# Patient Record
Sex: FEMALE | Race: WHITE | Employment: UNEMPLOYED | RURAL
[De-identification: names, ages, dates, MRNs, and addresses within clinical notes are randomized per-mention and may not be internally consistent; named-entity substitution may affect disease eponyms.]

---

## 2017-01-11 ENCOUNTER — OFFICE VISIT (OUTPATIENT)
Dept: FAMILY MEDICINE CLINIC | Age: 62
End: 2017-01-11

## 2017-01-11 VITALS
HEIGHT: 62 IN | SYSTOLIC BLOOD PRESSURE: 123 MMHG | BODY MASS INDEX: 25.58 KG/M2 | DIASTOLIC BLOOD PRESSURE: 70 MMHG | TEMPERATURE: 97.2 F | WEIGHT: 139 LBS | RESPIRATION RATE: 18 BRPM | OXYGEN SATURATION: 97 % | HEART RATE: 98 BPM

## 2017-01-11 DIAGNOSIS — H69.92 EUSTACHIAN TUBE DISORDER, LEFT: ICD-10-CM

## 2017-01-11 DIAGNOSIS — J01.00 ACUTE NON-RECURRENT MAXILLARY SINUSITIS: Primary | ICD-10-CM

## 2017-01-11 DIAGNOSIS — J20.9 COPD (CHRONIC OBSTRUCTIVE PULMONARY DISEASE) WITH ACUTE BRONCHITIS (HCC): ICD-10-CM

## 2017-01-11 DIAGNOSIS — Z72.0 TOBACCO ABUSE: ICD-10-CM

## 2017-01-11 DIAGNOSIS — J44.0 COPD (CHRONIC OBSTRUCTIVE PULMONARY DISEASE) WITH ACUTE BRONCHITIS (HCC): ICD-10-CM

## 2017-01-11 RX ORDER — AMOXICILLIN AND CLAVULANATE POTASSIUM 500; 125 MG/1; MG/1
1 TABLET, FILM COATED ORAL 2 TIMES DAILY
Qty: 20 TAB | Refills: 0 | Status: SHIPPED | OUTPATIENT
Start: 2017-01-11 | End: 2017-01-21

## 2017-01-11 RX ORDER — FLUTICASONE PROPIONATE 50 MCG
SPRAY, SUSPENSION (ML) NASAL
Qty: 1 BOTTLE | Refills: 0 | Status: SHIPPED | OUTPATIENT
Start: 2017-01-11 | End: 2017-10-06

## 2017-01-11 NOTE — PROGRESS NOTES
Titus Castelan is a 64 y.o. female who presents to the office today with the following:  Chief Complaint   Patient presents with    Sinus Infection       No Known Allergies    Current Outpatient Prescriptions   Medication Sig    amoxicillin-clavulanate (AUGMENTIN) 500-125 mg per tablet Take 1 Tab by mouth two (2) times a day for 10 days.  fluticasone (FLONASE) 50 mcg/actuation nasal spray 2 puffs each nostril daily    albuterol (PROAIR HFA) 90 mcg/actuation inhaler USE 2 INHALATIONS ORALLY   EVERY 6 HOURS AS NEEDED FORWHEEZING    salmon oil-omega-3 fatty acids 1,000-210 mg cap Take  by mouth daily.  melatonin (MELATONIN) 5 mg cap capsule Take 5 mg by mouth two (2) times a day. At night    citalopram (CELEXA) 10 mg tablet Take 1 Tab by mouth daily.  traZODone (DESYREL) 50 mg tablet Take 1 Tab by mouth nightly.  Cholecalciferol, Vitamin D3, (VITAMIN D3) 1,000 unit cap Take 1 Each by mouth daily.  letrozole (FEMARA) 2.5 mg tablet Take 2.5 mg by mouth daily.  acetaminophen (TYLENOL) 500 mg tablet Take 500 mg by mouth every six (6) hours as needed for Pain.  vitamin e 600 unit capsule Take  by mouth daily. No current facility-administered medications for this visit.         Past Medical History   Diagnosis Date    Arthritis      HANDS    Cancer ([facility] [address] 6/2014     BREAST    Chronic obstructive pulmonary disease (HCC)     Compression fracture      T7, mild on CXR    COPD (chronic obstructive pulmonary disease) (HCC)      mild on CXR    Depression     Diverticulosis     ETOH abuse     Fracture 6/6/14     LEFT 4TH TOE    Hyperlipidemia     Hypertension     Psychiatric disorder      ANXIETY, DEPRESSION       Past Surgical History   Procedure Laterality Date    Hx tubal ligation  1980    Hx gyn  1983 (AFTER HAVING LOST A CHILD)     REVERSAL TUBAL LIGATION    Hx colonoscopy  2010     polyps, q4y    Hx colonoscopy  2015     polyps x4, 3 adenomatous, 1 hyperplastic    Hx breast lumpectomy Left 6/17/14       History   Smoking Status    Current Every Day Smoker    Packs/day: 1.00    Years: 45.00   Smokeless Tobacco    Never Used     Comment: 6/13/14 IS CUTTING BACK, SHE STATES; GAVE HER \"STOP SMOKING\" PACKET       Family History   Problem Relation Age of Onset    Heart Disease Father     Cancer Sister      lung    Anesth Problems Neg Hx          History of Present Illness:  PHQ 2 / 9, over the last two weeks 6/16/2015   Little interest or pleasure in doing things Not at all   Feeling down, depressed or hopeless Not at all   Total Score PHQ 2 0     Patient of Dr. Daysi Gamino here for evaluation sinus complaints    Patient states over the last 2 weeks she has had URI complaints with nasal congestion, left-sided sinus pain and pressure. No significant drainage from the left nostril but some clear drainage from the right. During this time her left ear feels plugged and she has increased ringing in the left ear. No dizziness. She does have a history of COPD. She does continue to smoke. She has had some intermittent coughing during this time. She denies any significant chest congestion. No wheezes no shortness of breath. She has albuterol but has not felt the need to use this    She is planning on quitting smoking soon    She has declined flu shots and pneumonia shots          Review of Systems:          Physical Exam:  Visit Vitals    /70    Pulse 98    Temp 97.2 °F (36.2 °C) (Oral)    Resp 18    Ht 5' 2\" (1.575 m)    Wt 139 lb (63 kg)    SpO2 97%    BMI 25.42 kg/m2     Vitals:    01/11/17 1302   BP: 123/70   Pulse: 98   Resp: 18   Temp: 97.2 °F (36.2 °C)   TempSrc: Oral   SpO2: 97%   Weight: 139 lb (63 kg)   Height: 5' 2\" (1.575 m)    Patient no acute distress vitals as above  Head was normocephalic  External ears were normal.  Ear canals normal.  TMs were clear on the left. Some serous fluid on the right  Nose external ears normal.  No lesions.   Plus   congestion with very boggy turbinates. Left nares obstructed by the turbinates  Sinuses did have some tenderness of the left maxillary sinus  OP Mucosa normal.  Pharynx normal.  No erythema or exudate. Structures midline  Neck no nodes no masses  Chest had coarse expiratory breath sounds no wheezes rhonchi or rales. Good air exchange  Cor regular rate and rhythm no murmurs      Assessment/Plan:  1. Acute non-recurrent maxillary sinusitis  Patient with URI/sinusitis nasal congestion and eustachian tube dysfunction. Will treat her with Augmentin and Flonase. I did review eustachian tube exercises. She will follow-up if symptoms persist    2. Eustachian tube disorder, left      3. Tobacco abuse  Patient advised to stop smoking    4. COPD (chronic obstructive pulmonary disease) with acute bronchitis ([address]  May use albuterol as needed    Patient will otherwise keep her previously planned follow-up with Dr. Roberta Craig for routine medical care    Orders Placed This Encounter    amoxicillin-clavulanate (AUGMENTIN) 500-125 mg per tablet    fluticasone (FLONASE) 50 mcg/actuation nasal spray   . Cipriano Brown Patient Instructions   Home, rest, lots of fluids, vaporizer if needed. augmentin x 10 days  flonase daily  eustacian tube exercises  Over the counter cold medications if needed. Follow up if worse or not better next 3-5 days. Stop smoking  Keep planned follow up              Continue current therapy plan except for indicated above. Verbal and written instructions (see AVS) provided.  Patient expresses understanding of diagnosis and treatment plan. Follow-up Disposition:  Return if symptoms worsen or fail to improve. Qian Gonzalez.  Rebeca Randle MD

## 2017-01-11 NOTE — PATIENT INSTRUCTIONS
Home, rest, lots of fluids, vaporizer if needed. augmentin x 10 days  flonase daily  eustacian tube exercises  Over the counter cold medications if needed. Follow up if worse or not better next 3-5 days.     Stop smoking  Keep planned follow up

## 2017-02-15 ENCOUNTER — TELEPHONE (OUTPATIENT)
Dept: FAMILY MEDICINE CLINIC | Age: 62
End: 2017-02-15

## 2017-02-15 DIAGNOSIS — R92.8 ABNORMAL MAMMOGRAM: Primary | ICD-10-CM

## 2017-02-15 NOTE — TELEPHONE ENCOUNTER
Pt has a 6 mo f/u mammogram scheduled at [facility] on 2/20/2017 in the am. She needs an order sent over. She had an abnormal mammo 6 months ago.

## 2017-02-21 ENCOUNTER — TELEPHONE (OUTPATIENT)
Dept: FAMILY MEDICINE CLINIC | Age: 62
End: 2017-02-21

## 2017-02-24 ENCOUNTER — TELEPHONE (OUTPATIENT)
Dept: FAMILY MEDICINE CLINIC | Age: 62
End: 2017-02-24

## 2017-02-27 DIAGNOSIS — R92.8 ABNORMAL MAMMOGRAM: ICD-10-CM

## 2017-04-17 DIAGNOSIS — G47.00 INSOMNIA, UNSPECIFIED TYPE: ICD-10-CM

## 2017-04-17 RX ORDER — TRAZODONE HYDROCHLORIDE 50 MG/1
50 TABLET ORAL
Qty: 90 TAB | Refills: 0 | Status: SHIPPED | OUTPATIENT
Start: 2017-04-17 | End: 2017-07-03 | Stop reason: SDUPTHER

## 2017-05-31 RX ORDER — ALBUTEROL SULFATE 90 UG/1
AEROSOL, METERED RESPIRATORY (INHALATION)
Qty: 1 INHALER | Refills: 2 | Status: SHIPPED | OUTPATIENT
Start: 2017-05-31 | End: 2017-10-06 | Stop reason: SDUPTHER

## 2017-06-13 ENCOUNTER — TELEPHONE (OUTPATIENT)
Dept: FAMILY MEDICINE CLINIC | Age: 62
End: 2017-06-13

## 2017-06-13 NOTE — TELEPHONE ENCOUNTER
Please call pt, if she has any shortness of breath she needs to come to the office  Also needs to give us more information about the breathing machine? In the car?

## 2017-06-14 NOTE — TELEPHONE ENCOUNTER
Spoke w/pt. Pt states she was diagnosed w/COPD by  & prescribed ProAir. 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

## 2017-07-03 DIAGNOSIS — G47.00 INSOMNIA, UNSPECIFIED TYPE: ICD-10-CM

## 2017-07-03 RX ORDER — TRAZODONE HYDROCHLORIDE 50 MG/1
TABLET ORAL
Qty: 90 TAB | Refills: 0 | Status: SHIPPED | OUTPATIENT
Start: 2017-07-03 | End: 2017-08-28 | Stop reason: SDUPTHER

## 2017-10-06 ENCOUNTER — OFFICE VISIT (OUTPATIENT)
Dept: FAMILY MEDICINE CLINIC | Age: 62
End: 2017-10-06

## 2017-10-06 VITALS
HEART RATE: 87 BPM | RESPIRATION RATE: 18 BRPM | DIASTOLIC BLOOD PRESSURE: 75 MMHG | BODY MASS INDEX: 26.37 KG/M2 | SYSTOLIC BLOOD PRESSURE: 115 MMHG | TEMPERATURE: 97.9 F | WEIGHT: 144.2 LBS | OXYGEN SATURATION: 96 %

## 2017-10-06 DIAGNOSIS — F41.9 ANXIETY: ICD-10-CM

## 2017-10-06 DIAGNOSIS — G47.00 INSOMNIA, UNSPECIFIED TYPE: ICD-10-CM

## 2017-10-06 DIAGNOSIS — Z00.00 WELL ADULT HEALTH CHECK: Primary | ICD-10-CM

## 2017-10-06 DIAGNOSIS — Z11.59 NEED FOR HEPATITIS C SCREENING TEST: ICD-10-CM

## 2017-10-06 DIAGNOSIS — E78.5 HYPERLIPIDEMIA, UNSPECIFIED HYPERLIPIDEMIA TYPE: ICD-10-CM

## 2017-10-06 DIAGNOSIS — J42 CHRONIC BRONCHITIS, UNSPECIFIED CHRONIC BRONCHITIS TYPE (HCC): ICD-10-CM

## 2017-10-06 DIAGNOSIS — J32.0 MAXILLARY SINUSITIS, UNSPECIFIED CHRONICITY: ICD-10-CM

## 2017-10-06 RX ORDER — ALBUTEROL SULFATE 90 UG/1
AEROSOL, METERED RESPIRATORY (INHALATION)
Qty: 3 INHALER | Refills: 3 | Status: SHIPPED | OUTPATIENT
Start: 2017-10-06 | End: 2018-08-21 | Stop reason: SDUPTHER

## 2017-10-06 RX ORDER — AMOXICILLIN 875 MG/1
875 TABLET, FILM COATED ORAL 2 TIMES DAILY
Qty: 20 TAB | Refills: 0 | Status: SHIPPED | OUTPATIENT
Start: 2017-10-06 | End: 2017-10-16

## 2017-10-06 RX ORDER — CITALOPRAM 10 MG/1
10 TABLET ORAL DAILY
Qty: 90 TAB | Refills: 3 | Status: SHIPPED | OUTPATIENT
Start: 2017-10-06 | End: 2018-07-27 | Stop reason: SDUPTHER

## 2017-10-06 RX ORDER — TRAZODONE HYDROCHLORIDE 50 MG/1
TABLET ORAL
Qty: 90 TAB | Refills: 3 | Status: SHIPPED | OUTPATIENT
Start: 2017-10-06 | End: 2018-11-18 | Stop reason: SDUPTHER

## 2017-10-06 NOTE — PROGRESS NOTES
Chief Complaint   Patient presents with    Follow-up     pt med check, labs if needed     Health Maintenance Due   Topic Date Due    Hepatitis C Screening  1955    ZOSTER VACCINE AGE 60>  03/04/2015    INFLUENZA AGE 9 TO ADULT  08/01/2017    Pneumococcal 19-64 Highest Risk (2 of 3 - PCV13) 10/06/2017     Visit Vitals    /75 (BP 1 Location: Left arm, BP Patient Position: Sitting)    Pulse 87    Temp 97.9 °F (36.6 °C) (Oral)    Resp 18    Wt 144 lb 3.2 oz (65.4 kg)    SpO2 96%    BMI 26.37 kg/m2     Amber Hua LPN

## 2017-10-06 NOTE — PROGRESS NOTES
HISTORY OF PRESENT ILLNESS  Braxton Chacon is a 58 y.o. female. Chief Complaint   Patient presents with    Follow-up     pt med check, labs if needed       HPI  Chronic Sinusitis  Flonase not helping  Saline rinse better  Has seen ENT and had MRI  No drainage, no mucus  But left facial pain  ABX help temporarily    Has seen dentist every 6 mo    On Celexa for Anxiety  Needs refills  No SE  Also on Trazodone prn    COPD    Hyperlipidemia  Not fasting    Review of Systems   Constitutional: Negative for fever. HENT: Positive for sinus pain and tinnitus. Negative for congestion and hearing loss. Eyes: Negative for blurred vision. Respiratory: Positive for cough. Negative for sputum production, shortness of breath and wheezing. Cardiovascular: Negative for chest pain and palpitations. Gastrointestinal: Negative for abdominal pain, diarrhea and heartburn. Genitourinary: Positive for urgency. Negative for dysuria and frequency. Neurological: Negative for dizziness and headaches. Endo/Heme/Allergies: Negative for polydipsia. Psychiatric/Behavioral: Negative for depression. The patient is nervous/anxious (at times).       Past Medical History:   Diagnosis Date    Arthritis     HANDS    Breast cancer ([facility] [address] 2013    left with lumpectomy    Cancer ([facility] [address] 6/2014    BREAST    Chronic obstructive pulmonary disease (HCC)     Chronic sinusitis     Compression fracture     T7, mild on CXR    COPD (chronic obstructive pulmonary disease) (HCC)     mild on CXR    Depression     Diverticulosis     ETOH abuse     Fracture 6/6/14    LEFT 4TH TOE    Hyperlipidemia     Hypertension     Psychiatric disorder     ANXIETY, DEPRESSION    Radiation therapy complication 2800    12 treatments     Past Surgical History:   Procedure Laterality Date    HX BREAST BIOPSY Right     2 STBB    HX BREAST LUMPECTOMY Left 6/17/14    malignant    HX COLONOSCOPY  2010    polyps, q4y    HX COLONOSCOPY  2015    polyps x4, 3 adenomatous, 1 hyperplastic    HX GYN  1983 (AFTER HAVING LOST A CHILD)    REVERSAL TUBAL LIGATION    HX TUBAL LIGATION  1980       Current Outpatient Prescriptions   Medication Sig Dispense Refill    traZODone (DESYREL) 50 mg tablet TAKE 1 TABLET NIGHTLY 90 Tab 3    albuterol (PROAIR HFA) 90 mcg/actuation inhaler USE 2 INHALATIONS ORALLY   EVERY 6 HOURS AS NEEDED FORWHEEZING 3 Inhaler 3    citalopram (CELEXA) 10 mg tablet Take 1 Tab by mouth daily. 90 Tab 3    amoxicillin (AMOXIL) 875 mg tablet Take 1 Tab by mouth two (2) times a day for 10 days. 20 Tab 0    vitamin e 600 unit capsule Take  by mouth daily.  salmon oil-omega-3 fatty acids 1,000-210 mg cap Take  by mouth daily.  melatonin (MELATONIN) 5 mg cap capsule Take 5 mg by mouth two (2) times a day. At night      Cholecalciferol, Vitamin D3, (VITAMIN D3) 1,000 unit cap Take 1 Each by mouth daily. 30 Cap 0    letrozole (FEMARA) 2.5 mg tablet Take 2.5 mg by mouth daily.  acetaminophen (TYLENOL) 500 mg tablet Take 500 mg by mouth every six (6) hours as needed for Pain. No Known Allergies  Visit Vitals    /75 (BP 1 Location: Left arm, BP Patient Position: Sitting)    Pulse 87    Temp 97.9 °F (36.6 °C) (Oral)    Resp 18    Wt 144 lb 3.2 oz (65.4 kg)    SpO2 96%    BMI 26.37 kg/m2     Physical Exam   Constitutional: She is oriented to person, place, and time. She appears well-developed and well-nourished. No distress. HENT:   Head: Normocephalic and atraumatic. Right Ear: External ear normal.   Left Ear: External ear normal.   Mouth/Throat: Oropharynx is clear and moist. No oropharyngeal exudate. Left nose with polyp and left max tenderness   Eyes: Conjunctivae and EOM are normal. Pupils are equal, round, and reactive to light. Cardiovascular: Normal rate, regular rhythm and normal heart sounds. Pulmonary/Chest: Effort normal and breath sounds normal.   Abdominal: Soft.  Bowel sounds are normal. Musculoskeletal: She exhibits no edema. Lymphadenopathy:     She has no cervical adenopathy. Neurological: She is alert and oriented to person, place, and time. Skin: Skin is warm and dry. Psychiatric: She has a normal mood and affect. Nursing note and vitals reviewed. ASSESSMENT and PLAN    ICD-10-CM ICD-9-CM    1. Well adult health check Z00.00 V70.0    2. Need for hepatitis C screening test Z11.59 V73.89 HEP C AB BY RIBA   3. Maxillary sinusitis, unspecified chronicity J32.0 473.0 REFERRAL TO ENT-OTOLARYNGOLOGY      amoxicillin (AMOXIL) 875 mg tablet   4. Anxiety F41.9 300.00 citalopram (CELEXA) 10 mg tablet   5. Insomnia, unspecified type G47.00 780.52 traZODone (DESYREL) 50 mg tablet   6. Hyperlipidemia, unspecified hyperlipidemia type E78.5 272.4 LIPID PANEL WITH LDL/HDL RATIO      METABOLIC PANEL, COMPREHENSIVE      AR HANDLG&/OR CONVEY OF SPEC FOR TR OFFICE TO LAB      AR COLLECTION VENOUS BLOOD,VENIPUNCTURE   7.  Chronic bronchitis, unspecified chronic bronchitis type (HCC) J42 491.9 albuterol (PROAIR HFA) 90 mcg/actuation inhaler

## 2017-10-07 LAB
ALBUMIN SERPL-MCNC: 4.5 G/DL (ref 3.6–4.8)
ALBUMIN/GLOB SERPL: 1.9 {RATIO} (ref 1.2–2.2)
ALP SERPL-CCNC: 92 IU/L (ref 39–117)
ALT SERPL-CCNC: 13 IU/L (ref 0–32)
AST SERPL-CCNC: 15 IU/L (ref 0–40)
BILIRUB SERPL-MCNC: 0.3 MG/DL (ref 0–1.2)
BUN SERPL-MCNC: 14 MG/DL (ref 8–27)
BUN/CREAT SERPL: 20 (ref 12–28)
CALCIUM SERPL-MCNC: 9.6 MG/DL (ref 8.7–10.3)
CHLORIDE SERPL-SCNC: 99 MMOL/L (ref 96–106)
CHOLEST SERPL-MCNC: 289 MG/DL (ref 100–199)
CO2 SERPL-SCNC: 27 MMOL/L (ref 18–29)
COMMENT, 144067: NORMAL
CREAT SERPL-MCNC: 0.69 MG/DL (ref 0.57–1)
GLOBULIN SER CALC-MCNC: 2.4 G/DL (ref 1.5–4.5)
GLUCOSE SERPL-MCNC: 96 MG/DL (ref 65–99)
HCV AB S/CO SERPL IA: <0.1 S/CO RATIO (ref 0–0.9)
HDLC SERPL-MCNC: 48 MG/DL
INTERPRETATION, 910389: NORMAL
LDLC SERPL CALC-MCNC: 180 MG/DL (ref 0–99)
LDLC/HDLC SERPL: 3.8 RATIO UNITS (ref 0–3.2)
POTASSIUM SERPL-SCNC: 5.3 MMOL/L (ref 3.5–5.2)
PROT SERPL-MCNC: 6.9 G/DL (ref 6–8.5)
SODIUM SERPL-SCNC: 142 MMOL/L (ref 134–144)
TRIGL SERPL-MCNC: 305 MG/DL (ref 0–149)
VLDLC SERPL CALC-MCNC: 61 MG/DL (ref 5–40)

## 2017-10-09 RX ORDER — ATORVASTATIN CALCIUM 10 MG/1
10 TABLET, FILM COATED ORAL DAILY
Qty: 30 TAB | Refills: 3 | Status: SHIPPED | OUTPATIENT
Start: 2017-10-09 | End: 2017-10-10

## 2017-10-09 NOTE — PROGRESS NOTES
Call pt, the Chol is high   I suggest to start a low dose of Chol lowering med and recheck in 3 mo  I am calling in a Rx, RTC if any SE  The sugar, kidney and liver tests are normal  The Hep C is negative

## 2017-10-10 RX ORDER — EZETIMIBE 10 MG/1
10 TABLET ORAL DAILY
Qty: 30 TAB | Refills: 3 | Status: SHIPPED | OUTPATIENT
Start: 2017-10-10 | End: 2017-12-28 | Stop reason: SDUPTHER

## 2017-10-10 NOTE — PROGRESS NOTES
Patient will try a cholesterol medication but does not want a statin medication and before calling the medication in she wants to check around to the various pharmacies to see where it is cheaper I will call and cancel the first RX you called in because she said no way she is taking a statin

## 2017-10-11 NOTE — PROGRESS NOTES
Patient notified about message and will call back so I can call into the pharmacy with the best best

## 2017-12-06 ENCOUNTER — TELEPHONE (OUTPATIENT)
Dept: FAMILY MEDICINE CLINIC | Age: 62
End: 2017-12-06

## 2017-12-06 NOTE — TELEPHONE ENCOUNTER
Patient states she has another sinus infection. Wondering if you will call her in an antibiotic to [facility] since she already knows what it is and she is having surgery next week.

## 2017-12-06 NOTE — TELEPHONE ENCOUNTER
Patient returns my call, I have spoken to her, advised that she will need to be seen to get an antibiotic.   Patient has made an appointment for Friday 12/8/17 at 8:20 am.

## 2017-12-08 ENCOUNTER — OFFICE VISIT (OUTPATIENT)
Dept: FAMILY MEDICINE CLINIC | Age: 62
End: 2017-12-08

## 2017-12-08 VITALS
HEART RATE: 89 BPM | WEIGHT: 147 LBS | SYSTOLIC BLOOD PRESSURE: 130 MMHG | TEMPERATURE: 97.2 F | BODY MASS INDEX: 26.89 KG/M2 | RESPIRATION RATE: 18 BRPM | OXYGEN SATURATION: 98 % | DIASTOLIC BLOOD PRESSURE: 80 MMHG

## 2017-12-08 DIAGNOSIS — J01.00 ACUTE NON-RECURRENT MAXILLARY SINUSITIS: Primary | ICD-10-CM

## 2017-12-08 RX ORDER — AMOXICILLIN 875 MG/1
875 TABLET, FILM COATED ORAL 2 TIMES DAILY
Qty: 20 TAB | Refills: 0 | Status: SHIPPED | OUTPATIENT
Start: 2017-12-08 | End: 2017-12-18

## 2017-12-08 NOTE — PROGRESS NOTES
Chief Complaint   Patient presents with    Nasal Congestion     pt c/o sinus infection sx; head congestion, sinus pressure     There are no preventive care reminders to display for this patient.   Visit Vitals    /80 (BP 1 Location: Right arm, BP Patient Position: Sitting)    Pulse 89    Temp 97.2 °F (36.2 °C) (Oral)    Resp 18    Wt 147 lb (66.7 kg)    SpO2 98%    BMI 26.89 kg/m2     Donte Mayer LPN

## 2017-12-08 NOTE — PROGRESS NOTES
HISTORY OF PRESENT ILLNESS  Daniela Busby is a 58 y.o. female. Chief Complaint   Patient presents with    Nasal Congestion     pt c/o sinus infection sx; head congestion, sinus pressure       HPI  Sinus congestion started 3 d ago  No drainage  Postnasal drip  Facial pain on left side    Tried Tylenol and Benadryl but knocked her out  Tried Saline spray    Has surgery in 2 w for deviated Septum    smoking    Review of Systems   Constitutional: Negative for fever. HENT: Positive for congestion and sinus pain. Negative for ear pain and sore throat. Respiratory: Positive for cough (little). Negative for shortness of breath and wheezing. Gastrointestinal: Negative for diarrhea, nausea and vomiting. Past Medical History:   Diagnosis Date    Arthritis     HANDS    Breast cancer ([facility] [address] 2013    left with lumpectomy    Cancer ([facility] [address] 6/2014    BREAST    Chronic obstructive pulmonary disease (HCC)     Chronic sinusitis     Compression fracture     T7, mild on CXR    COPD (chronic obstructive pulmonary disease) (HCC)     mild on CXR    Depression     Diverticulosis     ETOH abuse     Fracture 6/6/14    LEFT 4TH TOE    Hyperlipidemia     Hypertension     Psychiatric disorder     ANXIETY, DEPRESSION    Radiation therapy complication 4284    12 treatments     Current Outpatient Prescriptions   Medication Sig Dispense Refill    amoxicillin (AMOXIL) 875 mg tablet Take 1 Tab by mouth two (2) times a day for 10 days. 20 Tab 0    ezetimibe (ZETIA) 10 mg tablet Take 1 Tab by mouth daily. 30 Tab 3    traZODone (DESYREL) 50 mg tablet TAKE 1 TABLET NIGHTLY 90 Tab 3    albuterol (PROAIR HFA) 90 mcg/actuation inhaler USE 2 INHALATIONS ORALLY   EVERY 6 HOURS AS NEEDED FORWHEEZING 3 Inhaler 3    citalopram (CELEXA) 10 mg tablet Take 1 Tab by mouth daily. 90 Tab 3    vitamin e 600 unit capsule Take  by mouth daily.  Cholecalciferol, Vitamin D3, (VITAMIN D3) 1,000 unit cap Take 1 Each by mouth daily.  27 Cap 0    letrozole (FEMARA) 2.5 mg tablet Take 2.5 mg by mouth daily.  acetaminophen (TYLENOL) 500 mg tablet Take 500 mg by mouth every six (6) hours as needed for Pain. No Known Allergies  Visit Vitals    /80 (BP 1 Location: Right arm, BP Patient Position: Sitting)    Pulse 89    Temp 97.2 °F (36.2 °C) (Oral)    Resp 18    Wt 147 lb (66.7 kg)    SpO2 98%    BMI 26.89 kg/m2     Physical Exam   Constitutional: She is oriented to person, place, and time. She appears well-developed and well-nourished. No distress. HENT:   Head: Normocephalic and atraumatic. Right Ear: External ear normal.   Left Ear: External ear normal.   Mouth/Throat: Oropharynx is clear and moist. No oropharyngeal exudate. Left nostril with white mucus and left max facial tenderness   Eyes: Conjunctivae and EOM are normal. Pupils are equal, round, and reactive to light. Cardiovascular: Normal rate and regular rhythm. Pulmonary/Chest: Effort normal and breath sounds normal.   Lymphadenopathy:     She has no cervical adenopathy. Neurological: She is alert and oriented to person, place, and time. Skin: Skin is warm and dry. Psychiatric: She has a normal mood and affect. Nursing note and vitals reviewed. ASSESSMENT and PLAN    ICD-10-CM ICD-9-CM    1.  Acute non-recurrent maxillary sinusitis J01.00 461.0 amoxicillin (AMOXIL) 875 mg tablet   may add on Claritin or Sudafed

## 2017-12-28 RX ORDER — EZETIMIBE 10 MG/1
10 TABLET ORAL DAILY
Qty: 30 TAB | Refills: 0 | Status: SHIPPED | OUTPATIENT
Start: 2017-12-28 | End: 2018-07-27

## 2017-12-28 NOTE — TELEPHONE ENCOUNTER
Pt calls to request a mail order 90 supply w/ refills be sent to   Requested Prescriptions     Pending Prescriptions Disp Refills    ezetimibe (ZETIA) 10 mg tablet 30 Tab 3     Sig: Take 1 Tab by mouth daily.      [facility]

## 2017-12-28 NOTE — TELEPHONE ENCOUNTER
Request for Zetia refilled ×1 month  Dr. Guerita Carmona started this in October and wanted to see patient back for lab work in January

## 2018-07-27 ENCOUNTER — OFFICE VISIT (OUTPATIENT)
Dept: FAMILY MEDICINE CLINIC | Age: 63
End: 2018-07-27

## 2018-07-27 VITALS
TEMPERATURE: 97.1 F | OXYGEN SATURATION: 95 % | BODY MASS INDEX: 26.31 KG/M2 | HEART RATE: 80 BPM | RESPIRATION RATE: 16 BRPM | HEIGHT: 62 IN | SYSTOLIC BLOOD PRESSURE: 125 MMHG | WEIGHT: 143 LBS | DIASTOLIC BLOOD PRESSURE: 87 MMHG

## 2018-07-27 DIAGNOSIS — E78.5 HYPERLIPIDEMIA, UNSPECIFIED HYPERLIPIDEMIA TYPE: Primary | ICD-10-CM

## 2018-07-27 DIAGNOSIS — J42 CHRONIC BRONCHITIS, UNSPECIFIED CHRONIC BRONCHITIS TYPE (HCC): ICD-10-CM

## 2018-07-27 DIAGNOSIS — F32.A DEPRESSION, UNSPECIFIED DEPRESSION TYPE: ICD-10-CM

## 2018-07-27 DIAGNOSIS — E66.3 OVERWEIGHT (BMI 25.0-29.9): ICD-10-CM

## 2018-07-27 DIAGNOSIS — F41.9 ANXIETY: ICD-10-CM

## 2018-07-27 DIAGNOSIS — C50.912 MALIGNANT NEOPLASM OF LEFT FEMALE BREAST, UNSPECIFIED ESTROGEN RECEPTOR STATUS, UNSPECIFIED SITE OF BREAST (HCC): ICD-10-CM

## 2018-07-27 RX ORDER — CITALOPRAM 10 MG/1
TABLET ORAL
Qty: 45 TAB | Refills: 3 | Status: SHIPPED | OUTPATIENT
Start: 2018-07-27 | End: 2019-12-03 | Stop reason: SDUPTHER

## 2018-07-27 RX ORDER — AMPICILLIN TRIHYDRATE 250 MG
1200 CAPSULE ORAL
COMMUNITY
End: 2019-07-18

## 2018-07-27 RX ORDER — GLUCOSAM/CHONDRO/HERB 149/HYAL 750-100 MG
1 TABLET ORAL DAILY
COMMUNITY
End: 2021-06-04

## 2018-07-27 RX ORDER — CHOLECALCIFEROL (VITAMIN D3) 125 MCG
120 CAPSULE ORAL DAILY
COMMUNITY
End: 2019-07-18

## 2018-07-27 NOTE — MR AVS SNAPSHOT
[facility] 6 
[phone] Patient: Modesto Uribe MRN: [id number] Visit Information Date & Time Provider Department Dept. Phone Encounter #  
 7/27/2018  9:00 AM Robina Ayala MD [address] [place] [address] [phone] [id number] Upcoming Health Maintenance Date Due Influenza Age 5 to Adult 8/1/2018 Pneumococcal 19-64 Highest Risk (3 of 3 - PCV13) 10/6/2018 PAP AKA CERVICAL CYTOLOGY 10/6/2019 COLONOSCOPY 3/11/2020 BREAST CANCER SCRN MAMMOGRAM 6/22/2020 DTaP/Tdap/Td series (2 - Td) 10/6/2026 Allergies as of 7/27/2018  Review Complete On: 7/27/2018 By: Ruth Wright LPN No Known Allergies Current Immunizations  Never Reviewed No immunizations on file. Not reviewed this visit You Were Diagnosed With   
  
 Codes Comments Hyperlipidemia, unspecified hyperlipidemia type    -  Primary ICD-10-CM: E78.5 ICD-9-CM: 272.4 Overweight (BMI 25.0-29. 9)     ICD-10-CM: V90.9 ICD-9-CM: 278.02 Depression, unspecified depression type     ICD-10-CM: F32.9 ICD-9-CM: 063 Anxiety     ICD-10-CM: F41.9 ICD-9-CM: 300.00 Chronic bronchitis, unspecified chronic bronchitis type ([facility]     ICD-10-CM: Y76 ICD-9-CM: 491.9 Malignant neoplasm of left female breast, unspecified estrogen receptor status, unspecified site of breast ([facility]     ICD-10-CM: W57.062 ICD-9-CM: 174.9 Vitals BP Pulse Temp Resp Height(growth percentile) Weight(growth percentile) 125/87 80 97.1 °F (36.2 °C) (Temporal) 16 5' 2\" (1.575 m) 143 lb (64.9 kg) SpO2 BMI OB Status Smoking Status 95% 26.16 kg/m2 Menopause Current Every Day Smoker Vitals History BMI and BSA Data Body Mass Index Body Surface Area  
 26.16 kg/m 2 1.68 m 2 Preferred Pharmacy Pharmacy Name Phone [facility]ord[facility] [phone] Your Updated Medication List  
  
   
This list is accurate as of 7/27/18  5:36 PM.  Always use your most recent med list.  
  
  
  
  
 acetaminophen 500 mg tablet Commonly known as:  TYLENOL Take 500 mg by mouth every six (6) hours as needed for Pain. albuterol 90 mcg/actuation inhaler Commonly known as:  PROAIR HFA  
USE 2 INHALATIONS ORALLY   EVERY 6 HOURS AS NEEDED FORWHEEZING  
  
 cholecalciferol 1,000 unit Cap Commonly known as:  VITAMIN D3 Take 1 Each by mouth daily. citalopram 10 mg tablet Commonly known as:  Lavelle Boldener Take it every other day CO Q-10 100 mg capsule Generic drug:  co-enzyme Q-10 Take 120 mg by mouth daily. letrozole 2.5 mg tablet Commonly known as:  [facility] Take 2.5 mg by mouth daily. omega 3-DHA-EPA-fish oil 1,000 mg (120 mg-180 mg) capsule Take 1 Cap by mouth daily. red yeast rice extract 600 mg Cap Take 1,200 mg by mouth now. traZODone 50 mg tablet Commonly known as:  DESYREL  
TAKE 1 TABLET NIGHTLY  
  
 vitamin e 600 unit capsule Take  by mouth daily. Prescriptions Sent to Pharmacy Refills  
 citalopram (CELEXA) 10 mg tablet 3 Sig: Take it every other day Class: Normal  
 Pharmacy: [facility] [address] Ph #: [phone] We Performed the Following CBC WITH AUTOMATED DIFF [31582 CPT(R)] COLLECTION VENOUS BLOOD,VENIPUNCTURE [id number] CPT(R)] LIPID PANEL WITH LDL/HDL RATIO [60708 CPT(R)] METABOLIC PANEL, COMPREHENSIVE [60204 CPT(R)] GA HANDLG&/OR CONVEY OF SPEC FOR TR OFFICE TO LAB [71616 CPT(R)] Introducing [facility]! Dear Ifeanyi Gamez: Thank you for requesting a [employer] account. Our records indicate that you already have an active [employer] account. You can access your account anytime at [email or web address] Did you know that you can access your hospital and ER discharge instructions at any time in [employer]ustle? You can also review all of your test results from your hospital stay or ER visit. Additional Information If you have questions, please visit the Frequently Asked Questions section of the Bustle website at [email or web address] Remember, Bustle is NOT to be used for urgent needs. For medical emergencies, dial 911. Now available from your iPhone and Android! Please provide this summary of care documentation to your next provider. Your primary care clinician is listed as Sade Wills. If you have any questions after today's visit, please call [phone].

## 2018-07-27 NOTE — PROGRESS NOTES
HISTORY OF PRESENT ILLNESS  Yamile Peñaloza is a 61 y.o. female. Chief Complaint   Patient presents with    Medication Refill     is fasting       HPI  High Chol  Fasting   Not on Zetia  Taking Red Yeast Rice 1200 mg  No SE  Working on diet and exercise    Needs refill of Celexa  Taking it every other day  Doing good, feeling mor with lower dose    Overweight  Has lost 4 lbs  Watching diet  Doing a little exercises with eliptical    Breast Ca  On Letrozole  Ins not covering diagnostic    Still smoking half a pack a day  Using Albuterol twice to three times a day      Review of Systems   Constitutional: Positive for weight loss (trrying). HENT: Positive for tinnitus. Negative for congestion and hearing loss. Eyes: Negative for blurred vision. Respiratory: Negative for cough. Cardiovascular: Negative for chest pain. Gastrointestinal: Negative for blood in stool. Genitourinary: Negative for hematuria. Neurological: Negative for dizziness and headaches.      Past Medical History:   Diagnosis Date    Arthritis     HANDS    Breast cancer ([facility] [address] 2013    left with lumpectomy    Cancer ([facility] [address] 6/2014    BREAST    Chronic sinusitis     Compression fracture     T7, mild on CXR    COPD (chronic obstructive pulmonary disease) (HCC)     mild on CXR    Depression     Diverticulosis     ETOH abuse     Fracture 6/6/14    LEFT 4TH TOE    Hyperlipidemia     Hypertension     Menopause     Psychiatric disorder     ANXIETY, DEPRESSION    Radiation therapy complication 6404    12 treatments       Past Surgical History:   Procedure Laterality Date    HX BREAST BIOPSY Right     2 STBB benign    HX BREAST LUMPECTOMY Left 6/17/14    malignant    HX COLONOSCOPY  2010    polyps, q4y    HX COLONOSCOPY  2015    polyps x4, 3 adenomatous, 1 hyperplastic    HX GYN  1983 (AFTER HAVING LOST A CHILD)    REVERSAL TUBAL LIGATION    HX TUBAL LIGATION  1980       Current Outpatient Prescriptions   Medication Sig Dispense Refill    omega 3-DHA-EPA-fish oil 1,000 mg (120 mg-180 mg) capsule Take 1 Cap by mouth daily.  red yeast rice extract 600 mg cap Take 1,200 mg by mouth now.  co-enzyme Q-10 (CO Q-10) 100 mg capsule Take 120 mg by mouth daily.  citalopram (CELEXA) 10 mg tablet Take it every other day 45 Tab 3    traZODone (DESYREL) 50 mg tablet TAKE 1 TABLET NIGHTLY 90 Tab 3    albuterol (PROAIR HFA) 90 mcg/actuation inhaler USE 2 INHALATIONS ORALLY   EVERY 6 HOURS AS NEEDED FORWHEEZING 3 Inhaler 3    vitamin e 600 unit capsule Take  by mouth daily.  Cholecalciferol, Vitamin D3, (VITAMIN D3) 1,000 unit cap Take 1 Each by mouth daily. 30 Cap 0    letrozole (FEMARA) 2.5 mg tablet Take 2.5 mg by mouth daily.  acetaminophen (TYLENOL) 500 mg tablet Take 500 mg by mouth every six (6) hours as needed for Pain. No Known Allergies    Visit Vitals    /87    Pulse 80    Temp 97.1 °F (36.2 °C) (Temporal)    Resp 16    Ht 5' 2\" (1.575 m)    Wt 143 lb (64.9 kg)    SpO2 95%    BMI 26.16 kg/m2       Physical Exam   Constitutional: She is oriented to person, place, and time. She appears well-developed and well-nourished. No distress. HENT:   Head: Normocephalic and atraumatic. Right Ear: External ear normal.   Left Ear: External ear normal.   Mouth/Throat: Oropharynx is clear and moist. No oropharyngeal exudate. Eyes: Conjunctivae and EOM are normal. Pupils are equal, round, and reactive to light. Cardiovascular: Normal rate, regular rhythm and normal heart sounds. Pulmonary/Chest: Effort normal. She has wheezes (few). Abdominal: Soft. Bowel sounds are normal.   Musculoskeletal: She exhibits no edema. Lymphadenopathy:     She has no cervical adenopathy. Neurological: She is alert and oriented to person, place, and time. Skin: Skin is warm and dry. Psychiatric: She has a normal mood and affect. Nursing note and vitals reviewed.       ASSESSMENT and PLAN    ICD-10-CM ICD-9-CM    1. Hyperlipidemia, unspecified hyperlipidemia type Y27.2 961.2 METABOLIC PANEL, COMPREHENSIVE      LIPID PANEL WITH LDL/HDL RATIO      COLLECTION VENOUS BLOOD,VENIPUNCTURE      W[place] HANDLG&/OR CONVEY OF SPEC FOR TR OFFICE TO LAB   2. Overweight (BMI 25.0-29. 9) E66.3 278.02 Diet, exercise and weight loss discussed. 3. Depression, unspecified depression type F32.9 311 CBC WITH AUTOMATED DIFF   4. Anxiety F41.9 300.00 citalopram (CELEXA) 10 mg tablet   5. Chronic bronchitis, unspecified chronic bronchitis type (HCC) J42 491.9 Cont Albuterol prn  Try to stop smoking   6.  Malignant neoplasm of left female breast, unspecified estrogen receptor status, unspecified site of [facility] C50.912 174.9 F/U with Oncologist

## 2018-07-28 LAB
ALBUMIN SERPL-MCNC: 4.5 G/DL (ref 3.6–4.8)
ALBUMIN/GLOB SERPL: 1.9 {RATIO} (ref 1.2–2.2)
ALP SERPL-CCNC: 89 IU/L (ref 39–117)
ALT SERPL-CCNC: 13 IU/L (ref 0–32)
AST SERPL-CCNC: 20 IU/L (ref 0–40)
BASOPHILS # BLD AUTO: 0 X10E3/UL (ref 0–0.2)
BASOPHILS NFR BLD AUTO: 0 %
BILIRUB SERPL-MCNC: 0.6 MG/DL (ref 0–1.2)
BUN SERPL-MCNC: 12 MG/DL (ref 8–27)
BUN/CREAT SERPL: 15 (ref 12–28)
CALCIUM SERPL-MCNC: 9.4 MG/DL (ref 8.7–10.3)
CHLORIDE SERPL-SCNC: 99 MMOL/L (ref 96–106)
CHOLEST SERPL-MCNC: 241 MG/DL (ref 100–199)
CO2 SERPL-SCNC: 23 MMOL/L (ref 20–29)
CREAT SERPL-MCNC: 0.82 MG/DL (ref 0.57–1)
EOSINOPHIL # BLD AUTO: 0.1 X10E3/UL (ref 0–0.4)
EOSINOPHIL NFR BLD AUTO: 1 %
ERYTHROCYTE [DISTWIDTH] IN BLOOD BY AUTOMATED COUNT: 13.1 % (ref 12.3–15.4)
GLOBULIN SER CALC-MCNC: 2.4 G/DL (ref 1.5–4.5)
GLUCOSE SERPL-MCNC: 100 MG/DL (ref 65–99)
HCT VFR BLD AUTO: 49.9 % (ref 34–46.6)
HDLC SERPL-MCNC: 50 MG/DL
HGB BLD-MCNC: 16.6 G/DL (ref 11.1–15.9)
IMM GRANULOCYTES # BLD: 0 X10E3/UL (ref 0–0.1)
IMM GRANULOCYTES NFR BLD: 0 %
INTERPRETATION, 910389: NORMAL
LDLC SERPL CALC-MCNC: 147 MG/DL (ref 0–99)
LDLC/HDLC SERPL: 2.9 RATIO (ref 0–3.2)
LYMPHOCYTES # BLD AUTO: 1.6 X10E3/UL (ref 0.7–3.1)
LYMPHOCYTES NFR BLD AUTO: 27 %
MCH RBC QN AUTO: 32.3 PG (ref 26.6–33)
MCHC RBC AUTO-ENTMCNC: 33.3 G/DL (ref 31.5–35.7)
MCV RBC AUTO: 97 FL (ref 79–97)
MONOCYTES # BLD AUTO: 0.5 X10E3/UL (ref 0.1–0.9)
MONOCYTES NFR BLD AUTO: 8 %
NEUTROPHILS # BLD AUTO: 3.6 X10E3/UL (ref 1.4–7)
NEUTROPHILS NFR BLD AUTO: 64 %
PLATELET # BLD AUTO: 207 X10E3/UL (ref 150–379)
POTASSIUM SERPL-SCNC: 4.6 MMOL/L (ref 3.5–5.2)
PROT SERPL-MCNC: 6.9 G/DL (ref 6–8.5)
RBC # BLD AUTO: 5.14 X10E6/UL (ref 3.77–5.28)
SODIUM SERPL-SCNC: 140 MMOL/L (ref 134–144)
TRIGL SERPL-MCNC: 222 MG/DL (ref 0–149)
VLDLC SERPL CALC-MCNC: 44 MG/DL (ref 5–40)
WBC # BLD AUTO: 5.8 X10E3/UL (ref 3.4–10.8)

## 2018-07-30 NOTE — PROGRESS NOTES
Call pt, the CBC is ok  The sugar, kidney and liver tests are ok  The Chol is better, still elevated some  Cont current meds and still work on diet, exercise some weight loss  And she may want to add on OTC Vit Niacin 500 mg every day and recheck in 6 mo

## 2018-08-21 DIAGNOSIS — J42 CHRONIC BRONCHITIS, UNSPECIFIED CHRONIC BRONCHITIS TYPE (HCC): ICD-10-CM

## 2018-08-21 RX ORDER — ALBUTEROL SULFATE 90 UG/1
AEROSOL, METERED RESPIRATORY (INHALATION)
Qty: 3 INHALER | Refills: 1 | Status: SHIPPED | OUTPATIENT
Start: 2018-08-21 | End: 2019-02-06 | Stop reason: SDUPTHER

## 2018-12-20 ENCOUNTER — OFFICE VISIT (OUTPATIENT)
Dept: ONCOLOGY | Age: 63
End: 2018-12-20

## 2018-12-20 VITALS
DIASTOLIC BLOOD PRESSURE: 81 MMHG | OXYGEN SATURATION: 91 % | RESPIRATION RATE: 18 BRPM | BODY MASS INDEX: 25.28 KG/M2 | SYSTOLIC BLOOD PRESSURE: 153 MMHG | WEIGHT: 137.4 LBS | TEMPERATURE: 98.2 F | HEART RATE: 77 BPM | HEIGHT: 62 IN

## 2018-12-20 DIAGNOSIS — R91.8 RIGHT LOWER LOBE LUNG MASS: Primary | ICD-10-CM

## 2018-12-20 NOTE — PROGRESS NOTES
Frances Gann is a 61 y.o. female referred by Dr Estella Martinez Hx breast cancer, lung mass noted in CXR. Patient states she had Hormone receptor positive breast cancer in June 2014, she did not have chemo, she did have 12 sessions of XRT and is taking Femara, and has been since the end of 2014. Chemo was at [facility], Dr Jamie Nguyen (?), Nilesh Davalos at [facility] is her Oncologist for the breast cancer. She has never had a colonoscopy.

## 2018-12-20 NOTE — PROGRESS NOTES
Suki Toscano is a 61 y.o. female who presents to the office today for the following:  Chief Complaint   Patient presents with   Davidson Alvarez       Referring Provider:  Cesia Flynn MD    HPI  60 yo here for further eval of new lung lesions. Hx of Stage I IDC breast--ER+/her2-, on letrozole since 2014--Oncotype 7. Was found to have two lesions on CXR after brochitis. Further eval with CT showing two lesions in the right lung with central necrosis as well as hilar adenopathy. Currently denies pain or dypsnea. Some anxiety, some periods of depression. No hemoptysis. Has been ill with relapsing fever--been on multiple antibiotics and finally feeling better.       CURRENT TREATMENT:    Past Medical History:   Diagnosis Date    Arthritis     HANDS    Breast cancer ([facility] [address] 2013    left with lumpectomy    Cancer ([facility] [address] 6/2014    BREAST    Chronic sinusitis     Compression fracture     T7, mild on CXR    COPD (chronic obstructive pulmonary disease) (HCC)     mild on CXR    Depression     Diverticulosis     ETOH abuse     Fracture 6/6/14    LEFT 4TH TOE    Hyperlipidemia     Hypertension     Menopause     Psychiatric disorder     ANXIETY, DEPRESSION    Radiation therapy complication 7874    12 treatments     Past Surgical History:   Procedure Laterality Date    HX BREAST BIOPSY Right     2 STBB benign    HX BREAST LUMPECTOMY Left 6/17/14    malignant    HX COLONOSCOPY  2010    polyps, q4y    HX COLONOSCOPY  2015    polyps x4, 3 adenomatous, 1 hyperplastic    HX GYN  1983 (AFTER HAVING LOST A CHILD)    REVERSAL TUBAL LIGATION    HX TUBAL LIGATION  1980     Family History   Problem Relation Age of Onset    Heart Disease Father     Cancer Sister         lung    Anesth Problems Neg Hx      Social History     Socioeconomic History    Marital status:      Spouse name: Ivett Garcia Number of children: 1    Years of education: Not on file    Highest education level: Some college, no degree   Tobacco Use    Smoking status: Current Every Day Smoker     Packs/day: 1.00     Years: 45.00     Pack years: 45.00    Smokeless tobacco: Never Used    Tobacco comment: 112/20/18 patient smoking 405 cigarettes daily, she is trying to quit. Substance and Sexual Activity    Alcohol use: No     Comment: Sober X 18 mo's (10/6/16)    Drug use: No     Comment: MARIJUANA IN DISTANT PAST    Sexual activity: No   Other Topics Concern     Service No    Blood Transfusions No    Caffeine Concern No    Occupational Exposure No    Sleep Concern Yes    Exercise No    Seat Belt Yes    Self-Exams Yes       No flowsheet data found.]          Key Oncology Meds             letrozole (FEMARA) 2.5 mg tablet  (Taking) Take 2.5 mg by mouth daily. No Known Allergies  Current Outpatient Medications   Medication Sig    naproxen (NAPROSYN) 500 mg tablet Take 1 Tab by mouth two (2) times daily (with meals).  traZODone (DESYREL) 50 mg tablet TAKE 1 TABLET NIGHTLY    PROAIR HFA 90 mcg/actuation inhaler USE 2 INHALATIONS ORALLY   EVERY 6 HOURS AS NEEDED FORWHEEZING    omega 3-DHA-EPA-fish oil 1,000 mg (120 mg-180 mg) capsule Take 1 Cap by mouth daily.  red yeast rice extract 600 mg cap Take 1,200 mg by mouth now.  co-enzyme Q-10 (CO Q-10) 100 mg capsule Take 120 mg by mouth daily.  citalopram (CELEXA) 10 mg tablet Take it every other day    vitamin e 600 unit capsule Take  by mouth daily.  Cholecalciferol, Vitamin D3, (VITAMIN D3) 1,000 unit cap Take 1 Each by mouth daily.  letrozole (FEMARA) 2.5 mg tablet Take 2.5 mg by mouth daily.  acetaminophen (TYLENOL) 500 mg tablet Take 500 mg by mouth every six (6) hours as needed for Pain. No current facility-administered medications for this visit. Review of Systems   Constitutional: Negative. HENT: Negative. Eyes: Negative. Respiratory: Positive for cough. Cardiovascular: Negative. Gastrointestinal: Negative. Genitourinary: Negative. Musculoskeletal: Negative. Skin: Negative. Neurological: Negative. Endo/Heme/Allergies: Negative. Psychiatric/Behavioral: Negative. Physical Exam   Constitutional: She is oriented to person, place, and time and well-developed, well-nourished, and in no distress. HENT:   Head: Normocephalic and atraumatic. Eyes: EOM are normal. Pupils are equal, round, and reactive to light. Neck: Normal range of motion. Neck supple. Cardiovascular: Normal rate and regular rhythm. Pulmonary/Chest: Effort normal and breath sounds normal.   Abdominal: Soft. Bowel sounds are normal.   Musculoskeletal: Normal range of motion. Neurological: She is alert and oriented to person, place, and time. Skin: Skin is warm and dry. Psychiatric: Mood, affect and judgment normal.   Vitals reviewed. Visit Vitals  /81   Pulse 77   Temp 98.2 °F (36.8 °C) (Oral)   Resp 18   Ht 5' 2\" (1.575 m)   Wt 137 lb 6.4 oz (62.3 kg)   SpO2 91%   BMI 25.13 kg/m²         Imaging Results:    XR Results (maximum last 3): Results from [place] encounter on 12/11/18   XR CHEST PA LAT    Narrative Clinical indication: Cough. Frontal and lateral views of the chest obtained. Comparison to 2016. Diffuse  osteopenia. The heart size is normal. There are 2 masses in the a right lung  suspicious for malignancy. Left lung showed no acute findings. Compression  deformity seen in the thoracic spine . Impression  impression: New Right lung masses. CT of the chest with contrast suggested for  further evaluation. CT Results (maximum last 3): Results from [place] encounter on 12/14/18   CT CHEST W WO CONT    Narrative Indication: Lung mass    TECHNIQUE: Pre and post intravenous contrast thin section axial CT images of the  chest with coronal and sagittal reformatted images. COMPARISON: None    FINDINGS:  Thyroid gland is normal.    No axillary adenopathy.     Precarinal mediastinal lymph node measures 1 cm in short axis diameter. Subcarinal lymph node measures 9 mm in short axis. A 2.5 cm enlarged right hilar  lymph node. Dense right upper lobe peripheral masslike density measures 3.6 x 3.9 x 2.5 cm,  measuring in the transverse, AP and craniocaudal dimensions, respectively. There  is a central area of low density measuring approximately 2 cm in maximal  diameter; suspect central necrosis. Emphysematous changes of the lungs. Similar-appearing dense right middle lobe peripheral masslike density measures 4  x 4.5 x 4.5 cm, measuring in the transverse, AP and craniocaudal dimensions,  respectively. There is a central area of low density measuring approximately 2.3  cm in maximal diameter; suspect central necrosis. Suspected area of small linear scarring at the left lung apex. Limited evaluation of imaged portions of the upper abdomen. There is a cyst of  the right upper kidney measuring 1.6 cm. No destructive osseous lesions. Mild superior endplate compression fracture  deformity of the T7 vertebral body. Surgical changes of the left breast and axilla. Impression IMPRESSION:  Similar appearing masslike densities of the peripheral right upper and right  middle lung measuring 3.6 x 3.9 x 2.5 cm and 4 x 4 0.5 x 4.5 cm, respectively;  both masses have suspected central necrosis. Neoplastic process is suspected. Enlarged right hilar and mediastinal lymph nodes worrisome for metastatic  disease. Recommend appropriate action. The following CT dose reduction techniques were utilized: automated exposure  control and/or adjustment of the mA and/or kV according to patient size, and the  use of iterative reconstruction technique        MRI Results (maximum last 3): Results from [place] encounter on 06/03/14   MRI BREAST BI W WO CONT   Results from [facility] encounter on 05/30/14   MRI BREAST BI W CONT       Nuclear Medicine Results (maximum last 3):   Results from Hospital Encounter encounter on 06/17/14   NM LYMPHOSCINTIG LT BREAST    Narrative **Final Report**       ICD Codes / Adm. Diagnosis: 174.9   / BREAST CANCER    Examination:  NM LYMPHOSCINTIGRAPHY BREAST LT  - [id number] - Jun 17 2014    8:20AM  Accession No:  [id number]  Reason:  left breast cancer      REPORT:  Indication: Breast cancer. The patient's left breast was prepped and draped in the usual sterile   fashion. A total of 0.462 mCi TC-99 M filtered sulfur colloid was injected   into 4 quadrants into the periareolar region. The tracer was massaged into   the soft tissues. Single anterior image does not demonstrate a sentinel   lymph node. IMPRESSION: lymphatic mapping as above. Signing/Reading Doctor: Yeimi Rosario ([id number])    Approved: Yeimi Rosario ([id number])  Jun 17 2014  8:45AM                                   US Results (maximum last 3): Results from [facility] encounter on 06/20/14   US BREAST LT   Results from Abstract encounter on 05/03/14   US BREAST LT       [facility] Results (most recent):  Results from [place] encounter on 06/22/18   [facility] 3D DORA W MAMMO BI DX INCL CAD    Narrative BILATERAL DIGITAL DIAGNOSTIC MAMMOGRAM (WITH 3D DORA)    INDICATION:  Screening. COMPARISON:  Mammograms dating back to 2013. BREAST COMPOSITION: The breast tissue is heterogeneously dense, which could  obscure detection of small masses (approximately 51-75% glandular). FINDINGS:     Bilateral digital diagnostic mammography was performed, and is interpreted in  conjunction with a computer assisted detection (CAD) system. In addition to  standard 2D bilateral CC views, bilateral 3D tomosynthesis was performed in CC  and MLO projections. There are no suspicious masses or calcifications. Characteristically benign  calcifications are seen within both breasts. Persistent left breast skin  thickening. No areas of architectural distortion. No nipple retraction.        Impression IMPRESSION:    BI-RADS Assessment Category 2: Benign finding. No mammographic evidence of  malignancy. Next screening mammogram is recommended in one year. VAS/US Results (maximum last 3): No results found for this or any previous visit. PET Results (maximum last 3): No results found for this or any previous visit. No results found for any visits on 12/20/18. No orders of the defined types were placed in this encounter. ASSESSMENT and PLAN:  New Right Lung Masses--most likely lung primary--need FNA as well as PET. Will return once we have results--this is likely extensive stage disease. May need port--await results--will contact us if has any issues with pain or dyspnea. Watch calcium    Breast Cancer--very low risk disease--highly unlikely to be a recurrence--continue femara for now. There are no diagnoses linked to this encounter.     Follow-up Disposition: Not on Alma Elder MD

## 2018-12-28 RX ORDER — ZOLPIDEM TARTRATE 10 MG/1
10 TABLET ORAL
Qty: 14 TAB | Refills: 0 | Status: SHIPPED | OUTPATIENT
Start: 2018-12-28 | End: 2019-07-18

## 2019-01-03 ENCOUNTER — HOSPITAL ENCOUNTER (OUTPATIENT)
Dept: PET IMAGING | Age: 64
Discharge: HOME | End: 2019-01-03
Attending: INTERNAL MEDICINE
Payer: COMMERCIAL

## 2019-01-03 VITALS — WEIGHT: 135 LBS | HEIGHT: 62 IN | BODY MASS INDEX: 24.84 KG/M2

## 2019-01-03 DIAGNOSIS — R91.8 RIGHT LOWER LOBE LUNG MASS: ICD-10-CM

## 2019-01-03 PROCEDURE — A9552 F18 FDG: HCPCS

## 2019-01-03 RX ORDER — SODIUM CHLORIDE 0.9 % (FLUSH) 0.9 %
10 SYRINGE (ML) INJECTION
Status: COMPLETED | OUTPATIENT
Start: 2019-01-03 | End: 2019-01-03

## 2019-01-03 RX ADMIN — Medication 10 ML: at 11:25

## 2019-01-07 ENCOUNTER — HOSPITAL ENCOUNTER (OUTPATIENT)
Dept: CT IMAGING | Age: 64
Discharge: HOME | End: 2019-01-07
Attending: INTERNAL MEDICINE
Payer: COMMERCIAL

## 2019-01-07 VITALS
HEIGHT: 62 IN | HEART RATE: 80 BPM | BODY MASS INDEX: 24.66 KG/M2 | OXYGEN SATURATION: 95 % | SYSTOLIC BLOOD PRESSURE: 123 MMHG | DIASTOLIC BLOOD PRESSURE: 71 MMHG | RESPIRATION RATE: 14 BRPM | TEMPERATURE: 98.7 F | WEIGHT: 134 LBS

## 2019-01-07 DIAGNOSIS — R91.8 RIGHT LOWER LOBE LUNG MASS: ICD-10-CM

## 2019-01-07 PROCEDURE — 71250 CT THORAX DX C-: CPT

## 2019-01-07 PROCEDURE — 74011250636 HC RX REV CODE- 250/636: Performed by: STUDENT IN AN ORGANIZED HEALTH CARE EDUCATION/TRAINING PROGRAM

## 2019-01-07 RX ORDER — SODIUM CHLORIDE 9 MG/ML
25 INJECTION, SOLUTION INTRAVENOUS CONTINUOUS
Status: DISCONTINUED | OUTPATIENT
Start: 2019-01-07 | End: 2019-01-11 | Stop reason: HOSPADM

## 2019-01-07 RX ORDER — LIDOCAINE HYDROCHLORIDE 20 MG/ML
INJECTION, SOLUTION EPIDURAL; INFILTRATION; INTRACAUDAL; PERINEURAL
Status: DISCONTINUED
Start: 2019-01-07 | End: 2019-01-07

## 2019-01-07 RX ORDER — FENTANYL CITRATE 50 UG/ML
100 INJECTION, SOLUTION INTRAMUSCULAR; INTRAVENOUS
Status: DISCONTINUED | OUTPATIENT
Start: 2019-01-07 | End: 2019-01-11 | Stop reason: HOSPADM

## 2019-01-07 RX ORDER — LIDOCAINE HYDROCHLORIDE 10 MG/ML
INJECTION, SOLUTION EPIDURAL; INFILTRATION; INTRACAUDAL; PERINEURAL
Status: DISCONTINUED
Start: 2019-01-07 | End: 2019-01-07

## 2019-01-07 RX ORDER — MIDAZOLAM HYDROCHLORIDE 1 MG/ML
5 INJECTION, SOLUTION INTRAMUSCULAR; INTRAVENOUS
Status: DISCONTINUED | OUTPATIENT
Start: 2019-01-07 | End: 2019-01-11 | Stop reason: HOSPADM

## 2019-01-07 RX ORDER — LIDOCAINE HYDROCHLORIDE 20 MG/ML
INJECTION, SOLUTION INFILTRATION; PERINEURAL
Status: DISCONTINUED
Start: 2019-01-07 | End: 2019-01-07

## 2019-01-07 NOTE — H&P
Radiology History and Physical    Patient: Clemencia Schulte 61 y.o. female       Chief Complaint: No chief complaint on file. History of Present Illness: Hypermetabolic right lung mass, here for CT guided bx. History:    Past Medical History:   Diagnosis Date    Arthritis     HANDS    Breast cancer ([facility] [address] 2013    left with lumpectomy    Cancer ([facility] [address] 6/2014    BREAST    Chronic sinusitis     Compression fracture     T7, mild on CXR    COPD (chronic obstructive pulmonary disease) (HCC)     mild on CXR    Depression     Diverticulosis     ETOH abuse     Fracture 6/6/14    LEFT 4TH TOE    Hyperlipidemia     Hypertension     Menopause     Psychiatric disorder     ANXIETY, DEPRESSION    Radiation therapy complication 4339    12 treatments     Family History   Problem Relation Age of Onset    Heart Disease Father     Cancer Sister         lung    Anesth Problems Neg Hx      Social History     Socioeconomic History    Marital status:      Spouse name: Fariha Warren Number of children: 1    Years of education: Not on file    Highest education level: Some college, no degree   Social Needs    Financial resource strain: Not on file    Food insecurity - worry: Not on file    Food insecurity - inability: Not on file   [employer] needs - medical: Not on file   [employer] needs - non-medical: Not on file   Occupational History    Not on file   Tobacco Use    Smoking status: Current Every Day Smoker     Packs/day: 1.00     Years: 45.00     Pack years: 45.00    Smokeless tobacco: Never Used    Tobacco comment: 112/20/18 patient smoking 405 cigarettes daily, she is trying to quit.    Substance and Sexual Activity    Alcohol use: No     Comment: Sober X 18 mo's (10/6/16)    Drug use: No     Comment: MARIJUANA IN DISTANT PAST    Sexual activity: No   Other Topics Concern     Service No    Blood Transfusions No    Caffeine Concern No    Occupational Exposure No    Hobby Hazards Not Asked    Sleep Concern Yes    Stress Concern Not Asked    Weight Concern Not Asked    Special Diet Not Asked    Back Care Not Asked    Exercise No    Bike Helmet Not Asked    Seat Belt Yes    Self-Exams Yes   Social History Narrative    Lives in [place] with her . 2 cats and a dog. Spiritual.  Finds hope in her 6 yo grandson that lives in [place]. Allergies: No Known Allergies    Current Medications:  Current Outpatient Medications   Medication Sig    zolpidem (AMBIEN) 10 mg tablet Take 1 Tab by mouth nightly as needed for Sleep. Max Daily Amount: 10 mg.    naproxen (NAPROSYN) 500 mg tablet Take 1 Tab by mouth two (2) times daily (with meals).  traZODone (DESYREL) 50 mg tablet TAKE 1 TABLET NIGHTLY    PROAIR HFA 90 mcg/actuation inhaler USE 2 INHALATIONS ORALLY   EVERY 6 HOURS AS NEEDED FORWHEEZING    omega 3-DHA-EPA-fish oil 1,000 mg (120 mg-180 mg) capsule Take 1 Cap by mouth daily.  red yeast rice extract 600 mg cap Take 1,200 mg by mouth now.  co-enzyme Q-10 (CO Q-10) 100 mg capsule Take 120 mg by mouth daily.  citalopram (CELEXA) 10 mg tablet Take it every other day    vitamin e 600 unit capsule Take  by mouth daily.  Cholecalciferol, Vitamin D3, (VITAMIN D3) 1,000 unit cap Take 1 Each by mouth daily.  letrozole (FEMARA) 2.5 mg tablet Take 2.5 mg by mouth daily.  acetaminophen (TYLENOL) 500 mg tablet Take 500 mg by mouth every six (6) hours as needed for Pain. Current Facility-Administered Medications   Medication Dose Route Frequency    0.9% sodium chloride infusion  25 mL/hr IntraVENous CONTINUOUS    fentaNYL citrate (PF) injection 100 mcg  100 mcg IntraVENous Multiple    midazolam (VERSED) injection 5 mg  5 mg IntraVENous Rad Multiple        Physical Exam:  There were no vitals taken for this visit.   GENERAL: alert, cooperative, no distress, appears stated age  LUNG: clear to auscultation bilaterally  HEART: regular rate and rhythm  ABD: Non tender, non distended. Alerts:    Hospital Problems  Date Reviewed: 12/20/2018    None          Laboratory:    No results for input(s): HGB, HCT, WBC, PLT, INR, BUN, CREA, K, CRCLT, HGBEXT, HCTEXT, PLTEXT in the last 72 hours. No lab exists for component: PTT, PT, INREXT      Plan of Care/Planned Procedure:  Risks, benefits, and alternatives reviewed with patient and she agrees to proceed with the procedure. Deemed appropriate or moderate sedation with versed and fentanyl.       Antonio Gregory MD

## 2019-01-08 ENCOUNTER — TELEPHONE (OUTPATIENT)
Dept: ONCOLOGY | Age: 64
End: 2019-01-08

## 2019-01-08 NOTE — TELEPHONE ENCOUNTER
Patient called asking if she should keep CT Head that was ordered by Dr Chelsea Moore. Called Dr Chelsea Moore no need to do CT head at this time, Repeat CT of chest in 1-2 months and f/u appointment in 1 month. Relayed this message to Mrs Rick Chavez, who verbalized understanding, appointment scheduled for f/u for 2/8/19. Thanked for call.

## 2019-02-06 DIAGNOSIS — J42 CHRONIC BRONCHITIS, UNSPECIFIED CHRONIC BRONCHITIS TYPE (HCC): ICD-10-CM

## 2019-02-06 RX ORDER — ALBUTEROL SULFATE 90 UG/1
AEROSOL, METERED RESPIRATORY (INHALATION)
Qty: 3 INHALER | Refills: 1 | Status: SHIPPED | OUTPATIENT
Start: 2019-02-06 | End: 2019-06-23 | Stop reason: SDUPTHER

## 2019-04-18 DIAGNOSIS — G47.00 INSOMNIA, UNSPECIFIED TYPE: ICD-10-CM

## 2019-04-18 RX ORDER — TRAZODONE HYDROCHLORIDE 50 MG/1
TABLET ORAL
Qty: 90 TAB | Refills: 0 | Status: SHIPPED | OUTPATIENT
Start: 2019-04-18 | End: 2019-07-18 | Stop reason: SDUPTHER

## 2019-06-22 DIAGNOSIS — J42 CHRONIC BRONCHITIS, UNSPECIFIED CHRONIC BRONCHITIS TYPE (HCC): ICD-10-CM

## 2019-06-23 RX ORDER — ALBUTEROL SULFATE 90 UG/1
AEROSOL, METERED RESPIRATORY (INHALATION)
Qty: 25.5 G | Refills: 1 | Status: SHIPPED | OUTPATIENT
Start: 2019-06-23 | End: 2019-11-25 | Stop reason: SDUPTHER

## 2019-11-25 DIAGNOSIS — J42 CHRONIC BRONCHITIS, UNSPECIFIED CHRONIC BRONCHITIS TYPE (HCC): ICD-10-CM

## 2019-11-25 RX ORDER — ALBUTEROL SULFATE 90 UG/1
AEROSOL, METERED RESPIRATORY (INHALATION)
Qty: 25.5 G | Refills: 1 | Status: SHIPPED | OUTPATIENT
Start: 2019-11-25 | End: 2020-04-18

## 2020-04-17 DIAGNOSIS — J42 CHRONIC BRONCHITIS, UNSPECIFIED CHRONIC BRONCHITIS TYPE (HCC): ICD-10-CM

## 2020-04-18 RX ORDER — ALBUTEROL SULFATE 90 UG/1
AEROSOL, METERED RESPIRATORY (INHALATION)
Qty: 25.5 G | Refills: 1 | Status: SHIPPED | OUTPATIENT
Start: 2020-04-18 | End: 2020-05-05 | Stop reason: SDUPTHER

## 2021-04-23 DIAGNOSIS — J42 CHRONIC BRONCHITIS, UNSPECIFIED CHRONIC BRONCHITIS TYPE (HCC): ICD-10-CM

## 2021-04-23 RX ORDER — ALBUTEROL SULFATE 90 UG/1
AEROSOL, METERED RESPIRATORY (INHALATION)
Qty: 54 G | Refills: 1 | Status: SHIPPED | OUTPATIENT
Start: 2021-04-23 | End: 2021-09-20

## 2021-04-29 ENCOUNTER — APPOINTMENT (OUTPATIENT)
Dept: CT IMAGING | Age: 66
End: 2021-04-29
Attending: EMERGENCY MEDICINE
Payer: MEDICARE

## 2021-04-29 ENCOUNTER — HOSPITAL ENCOUNTER (INPATIENT)
Age: 66
LOS: 2 days | Discharge: HOME | DRG: 379 | End: 2021-05-01
Attending: STUDENT IN AN ORGANIZED HEALTH CARE EDUCATION/TRAINING PROGRAM | Admitting: STUDENT IN AN ORGANIZED HEALTH CARE EDUCATION/TRAINING PROGRAM
Payer: MEDICARE

## 2021-04-29 ENCOUNTER — HOSPITAL ENCOUNTER (EMERGENCY)
Age: 66
Discharge: ANOTHER HOSPITAL | End: 2021-04-29
Attending: EMERGENCY MEDICINE
Payer: MEDICARE

## 2021-04-29 ENCOUNTER — TELEPHONE (OUTPATIENT)
Dept: FAMILY MEDICINE CLINIC | Age: 66
End: 2021-04-29

## 2021-04-29 ENCOUNTER — APPOINTMENT (OUTPATIENT)
Dept: GENERAL RADIOLOGY | Age: 66
DRG: 379 | End: 2021-04-29
Attending: STUDENT IN AN ORGANIZED HEALTH CARE EDUCATION/TRAINING PROGRAM
Payer: MEDICARE

## 2021-04-29 VITALS
OXYGEN SATURATION: 96 % | HEART RATE: 100 BPM | BODY MASS INDEX: 21.21 KG/M2 | SYSTOLIC BLOOD PRESSURE: 160 MMHG | TEMPERATURE: 97.9 F | RESPIRATION RATE: 18 BRPM | DIASTOLIC BLOOD PRESSURE: 87 MMHG | WEIGHT: 115.96 LBS

## 2021-04-29 DIAGNOSIS — K92.2 LOWER GI BLEEDING: Primary | ICD-10-CM

## 2021-04-29 DIAGNOSIS — K92.2 GASTROINTESTINAL HEMORRHAGE, UNSPECIFIED GASTROINTESTINAL HEMORRHAGE TYPE: Primary | ICD-10-CM

## 2021-04-29 LAB
ABO + RH BLD: NORMAL
ALBUMIN SERPL-MCNC: 3 G/DL (ref 3.5–5)
ALBUMIN SERPL-MCNC: 3.3 G/DL (ref 3.5–5)
ALBUMIN/GLOB SERPL: 1 {RATIO} (ref 1.1–2.2)
ALBUMIN/GLOB SERPL: 1 {RATIO} (ref 1.1–2.2)
ALP SERPL-CCNC: 65 U/L (ref 45–117)
ALP SERPL-CCNC: 71 U/L (ref 45–117)
ALT SERPL-CCNC: 19 U/L (ref 12–78)
ALT SERPL-CCNC: 22 U/L (ref 12–78)
ANION GAP SERPL CALC-SCNC: 12 MMOL/L (ref 5–15)
ANION GAP SERPL CALC-SCNC: 8 MMOL/L (ref 5–15)
APPEARANCE UR: CLEAR
APTT PPP: 23.9 SEC (ref 22.1–31)
AST SERPL-CCNC: 15 U/L (ref 15–37)
AST SERPL-CCNC: 20 U/L (ref 15–37)
BACTERIA URNS QL MICRO: NEGATIVE /HPF
BASOPHILS # BLD: 0 K/UL (ref 0–0.1)
BASOPHILS # BLD: 0 K/UL (ref 0–0.1)
BASOPHILS NFR BLD: 0 % (ref 0–1)
BASOPHILS NFR BLD: 0 % (ref 0–1)
BILIRUB SERPL-MCNC: 0.5 MG/DL (ref 0.2–1)
BILIRUB SERPL-MCNC: 0.8 MG/DL (ref 0.2–1)
BILIRUB UR QL: NEGATIVE
BLOOD GROUP ANTIBODIES SERPL: NORMAL
BUN SERPL-MCNC: 10 MG/DL (ref 6–20)
BUN SERPL-MCNC: 12 MG/DL (ref 6–20)
BUN/CREAT SERPL: 19 (ref 12–20)
BUN/CREAT SERPL: 29 (ref 12–20)
CALCIUM SERPL-MCNC: 8.4 MG/DL (ref 8.5–10.1)
CALCIUM SERPL-MCNC: 8.7 MG/DL (ref 8.5–10.1)
CHLORIDE SERPL-SCNC: 100 MMOL/L (ref 97–108)
CHLORIDE SERPL-SCNC: 103 MMOL/L (ref 97–108)
CO2 SERPL-SCNC: 25 MMOL/L (ref 21–32)
CO2 SERPL-SCNC: 26 MMOL/L (ref 21–32)
COLOR UR: ABNORMAL
COMMENT, HOLDF: NORMAL
CREAT SERPL-MCNC: 0.34 MG/DL (ref 0.55–1.02)
CREAT SERPL-MCNC: 0.62 MG/DL (ref 0.55–1.02)
DIFFERENTIAL METHOD BLD: ABNORMAL
DIFFERENTIAL METHOD BLD: ABNORMAL
EOSINOPHIL # BLD: 0 K/UL (ref 0–0.4)
EOSINOPHIL # BLD: 0 K/UL (ref 0–0.4)
EOSINOPHIL NFR BLD: 0 % (ref 0–7)
EOSINOPHIL NFR BLD: 1 % (ref 0–7)
EPITH CASTS URNS QL MICRO: ABNORMAL /LPF
ERYTHROCYTE [DISTWIDTH] IN BLOOD BY AUTOMATED COUNT: 11.9 % (ref 11.5–14.5)
ERYTHROCYTE [DISTWIDTH] IN BLOOD BY AUTOMATED COUNT: 12 % (ref 11.5–14.5)
ETHANOL SERPL-MCNC: 129 MG/DL
GLOBULIN SER CALC-MCNC: 3.1 G/DL (ref 2–4)
GLOBULIN SER CALC-MCNC: 3.4 G/DL (ref 2–4)
GLUCOSE SERPL-MCNC: 77 MG/DL (ref 65–100)
GLUCOSE SERPL-MCNC: 84 MG/DL (ref 65–100)
GLUCOSE UR STRIP.AUTO-MCNC: NEGATIVE MG/DL
HCT VFR BLD AUTO: 40.3 % (ref 35–47)
HCT VFR BLD AUTO: 43.3 % (ref 35–47)
HEMOCCULT STL QL: POSITIVE
HGB BLD-MCNC: 13.9 G/DL (ref 11.5–16)
HGB BLD-MCNC: 14.9 G/DL (ref 11.5–16)
HGB UR QL STRIP: ABNORMAL
IMM GRANULOCYTES # BLD AUTO: 0 K/UL (ref 0–0.04)
IMM GRANULOCYTES # BLD AUTO: 0 K/UL (ref 0–0.04)
IMM GRANULOCYTES NFR BLD AUTO: 1 % (ref 0–0.5)
IMM GRANULOCYTES NFR BLD AUTO: 1 % (ref 0–0.5)
INR PPP: 1 (ref 0.9–1.1)
KETONES UR QL STRIP.AUTO: NEGATIVE MG/DL
LACTATE SERPL-SCNC: 0.8 MMOL/L (ref 0.4–2)
LACTATE SERPL-SCNC: 2.8 MMOL/L (ref 0.4–2)
LACTATE SERPL-SCNC: 3.8 MMOL/L (ref 0.4–2)
LEUKOCYTE ESTERASE UR QL STRIP.AUTO: NEGATIVE
LIPASE SERPL-CCNC: 72 U/L (ref 73–393)
LYMPHOCYTES # BLD: 1.4 K/UL (ref 0.8–3.5)
LYMPHOCYTES # BLD: 1.4 K/UL (ref 0.8–3.5)
LYMPHOCYTES NFR BLD: 20 % (ref 12–49)
LYMPHOCYTES NFR BLD: 22 % (ref 12–49)
MCH RBC QN AUTO: 35.6 PG (ref 26–34)
MCH RBC QN AUTO: 35.8 PG (ref 26–34)
MCHC RBC AUTO-ENTMCNC: 34.4 G/DL (ref 30–36.5)
MCHC RBC AUTO-ENTMCNC: 34.5 G/DL (ref 30–36.5)
MCV RBC AUTO: 103.6 FL (ref 80–99)
MCV RBC AUTO: 103.9 FL (ref 80–99)
MONOCYTES # BLD: 0.4 K/UL (ref 0–1)
MONOCYTES # BLD: 0.6 K/UL (ref 0–1)
MONOCYTES NFR BLD: 6 % (ref 5–13)
MONOCYTES NFR BLD: 9 % (ref 5–13)
NEUTS SEG # BLD: 4.4 K/UL (ref 1.8–8)
NEUTS SEG # BLD: 4.9 K/UL (ref 1.8–8)
NEUTS SEG NFR BLD: 67 % (ref 32–75)
NEUTS SEG NFR BLD: 73 % (ref 32–75)
NITRITE UR QL STRIP.AUTO: NEGATIVE
NRBC # BLD: 0 K/UL (ref 0–0.01)
NRBC # BLD: 0 K/UL (ref 0–0.01)
NRBC BLD-RTO: 0 PER 100 WBC
NRBC BLD-RTO: 0 PER 100 WBC
PH UR STRIP: 6 [PH] (ref 5–8)
PLATELET # BLD AUTO: 172 K/UL (ref 150–400)
PLATELET # BLD AUTO: 186 K/UL (ref 150–400)
PMV BLD AUTO: 8.7 FL (ref 8.9–12.9)
PMV BLD AUTO: 8.7 FL (ref 8.9–12.9)
POTASSIUM SERPL-SCNC: 3.9 MMOL/L (ref 3.5–5.1)
POTASSIUM SERPL-SCNC: 4.1 MMOL/L (ref 3.5–5.1)
PROT SERPL-MCNC: 6.1 G/DL (ref 6.4–8.2)
PROT SERPL-MCNC: 6.7 G/DL (ref 6.4–8.2)
PROT UR STRIP-MCNC: NEGATIVE MG/DL
PROTHROMBIN TIME: 10.1 SEC (ref 9–11.1)
RBC # BLD AUTO: 3.88 M/UL (ref 3.8–5.2)
RBC # BLD AUTO: 4.18 M/UL (ref 3.8–5.2)
RBC #/AREA URNS HPF: ABNORMAL /HPF (ref 0–5)
SAMPLES BEING HELD,HOLD: NORMAL
SODIUM SERPL-SCNC: 136 MMOL/L (ref 136–145)
SODIUM SERPL-SCNC: 138 MMOL/L (ref 136–145)
SP GR UR REFRACTOMETRY: 1.01 (ref 1–1.03)
SPECIMEN EXP DATE BLD: NORMAL
THERAPEUTIC RANGE,PTTT: NORMAL SECS (ref 58–77)
TROPONIN I SERPL-MCNC: <0.05 NG/ML
UROBILINOGEN UR QL STRIP.AUTO: 0.2 EU/DL (ref 0.2–1)
WBC # BLD AUTO: 6.5 K/UL (ref 3.6–11)
WBC # BLD AUTO: 6.8 K/UL (ref 3.6–11)
WBC URNS QL MICRO: ABNORMAL /HPF (ref 0–4)

## 2021-04-29 PROCEDURE — 82077 ASSAY SPEC XCP UR&BREATH IA: CPT

## 2021-04-29 PROCEDURE — 99285 EMERGENCY DEPT VISIT HI MDM: CPT

## 2021-04-29 PROCEDURE — 71045 X-RAY EXAM CHEST 1 VIEW: CPT

## 2021-04-29 PROCEDURE — 74011250636 HC RX REV CODE- 250/636: Performed by: EMERGENCY MEDICINE

## 2021-04-29 PROCEDURE — 85610 PROTHROMBIN TIME: CPT

## 2021-04-29 PROCEDURE — 82272 OCCULT BLD FECES 1-3 TESTS: CPT

## 2021-04-29 PROCEDURE — 36415 COLL VENOUS BLD VENIPUNCTURE: CPT

## 2021-04-29 PROCEDURE — 83690 ASSAY OF LIPASE: CPT

## 2021-04-29 PROCEDURE — 83605 ASSAY OF LACTIC ACID: CPT

## 2021-04-29 PROCEDURE — 74011000636 HC RX REV CODE- 636: Performed by: EMERGENCY MEDICINE

## 2021-04-29 PROCEDURE — 65660000000 HC RM CCU STEPDOWN

## 2021-04-29 PROCEDURE — 85730 THROMBOPLASTIN TIME PARTIAL: CPT

## 2021-04-29 PROCEDURE — 81001 URINALYSIS AUTO W/SCOPE: CPT

## 2021-04-29 PROCEDURE — 80053 COMPREHEN METABOLIC PANEL: CPT

## 2021-04-29 PROCEDURE — 74011000250 HC RX REV CODE- 250: Performed by: STUDENT IN AN ORGANIZED HEALTH CARE EDUCATION/TRAINING PROGRAM

## 2021-04-29 PROCEDURE — 84484 ASSAY OF TROPONIN QUANT: CPT

## 2021-04-29 PROCEDURE — 74011000250 HC RX REV CODE- 250: Performed by: EMERGENCY MEDICINE

## 2021-04-29 PROCEDURE — 85025 COMPLETE CBC W/AUTO DIFF WBC: CPT

## 2021-04-29 PROCEDURE — 96374 THER/PROPH/DIAG INJ IV PUSH: CPT

## 2021-04-29 PROCEDURE — 74011250636 HC RX REV CODE- 250/636: Performed by: STUDENT IN AN ORGANIZED HEALTH CARE EDUCATION/TRAINING PROGRAM

## 2021-04-29 PROCEDURE — 74177 CT ABD & PELVIS W/CONTRAST: CPT

## 2021-04-29 PROCEDURE — 86901 BLOOD TYPING SEROLOGIC RH(D): CPT

## 2021-04-29 PROCEDURE — C9113 INJ PANTOPRAZOLE SODIUM, VIA: HCPCS | Performed by: EMERGENCY MEDICINE

## 2021-04-29 RX ORDER — SODIUM CHLORIDE 9 MG/ML
100 INJECTION, SOLUTION INTRAVENOUS CONTINUOUS
Status: DISCONTINUED | OUTPATIENT
Start: 2021-04-29 | End: 2021-04-29 | Stop reason: HOSPADM

## 2021-04-29 RX ORDER — SODIUM CHLORIDE 0.9 % (FLUSH) 0.9 %
5-40 SYRINGE (ML) INJECTION EVERY 8 HOURS
Status: DISCONTINUED | OUTPATIENT
Start: 2021-04-29 | End: 2021-05-01 | Stop reason: HOSPADM

## 2021-04-29 RX ORDER — SODIUM CHLORIDE 9 MG/ML
75 INJECTION, SOLUTION INTRAVENOUS CONTINUOUS
Status: DISCONTINUED | OUTPATIENT
Start: 2021-04-29 | End: 2021-05-01 | Stop reason: HOSPADM

## 2021-04-29 RX ORDER — ACETAMINOPHEN 650 MG/1
650 SUPPOSITORY RECTAL
Status: DISCONTINUED | OUTPATIENT
Start: 2021-04-29 | End: 2021-05-01 | Stop reason: HOSPADM

## 2021-04-29 RX ORDER — ACETAMINOPHEN 325 MG/1
650 TABLET ORAL
Status: DISCONTINUED | OUTPATIENT
Start: 2021-04-29 | End: 2021-05-01 | Stop reason: HOSPADM

## 2021-04-29 RX ORDER — PANTOPRAZOLE SODIUM 40 MG/10ML
40 INJECTION, POWDER, LYOPHILIZED, FOR SOLUTION INTRAVENOUS
Status: DISCONTINUED | OUTPATIENT
Start: 2021-04-29 | End: 2021-04-29 | Stop reason: CLARIF

## 2021-04-29 RX ORDER — ONDANSETRON 2 MG/ML
4 INJECTION INTRAMUSCULAR; INTRAVENOUS
Status: DISCONTINUED | OUTPATIENT
Start: 2021-04-29 | End: 2021-04-29

## 2021-04-29 RX ORDER — PROMETHAZINE HYDROCHLORIDE 25 MG/1
12.5 TABLET ORAL
Status: DISCONTINUED | OUTPATIENT
Start: 2021-04-29 | End: 2021-05-01 | Stop reason: HOSPADM

## 2021-04-29 RX ORDER — CITALOPRAM 20 MG/1
10 TABLET, FILM COATED ORAL DAILY
Status: DISCONTINUED | OUTPATIENT
Start: 2021-04-30 | End: 2021-05-01 | Stop reason: HOSPADM

## 2021-04-29 RX ORDER — IBUPROFEN 200 MG
1 TABLET ORAL DAILY
Status: DISCONTINUED | OUTPATIENT
Start: 2021-04-29 | End: 2021-05-01 | Stop reason: HOSPADM

## 2021-04-29 RX ORDER — BUDESONIDE 0.25 MG/2ML
250 INHALANT ORAL 2 TIMES DAILY
Status: DISCONTINUED | OUTPATIENT
Start: 2021-04-30 | End: 2021-05-01 | Stop reason: HOSPADM

## 2021-04-29 RX ORDER — TRAZODONE HYDROCHLORIDE 50 MG/1
50 TABLET ORAL
Status: DISCONTINUED | OUTPATIENT
Start: 2021-04-29 | End: 2021-05-01 | Stop reason: HOSPADM

## 2021-04-29 RX ORDER — LORAZEPAM 2 MG/ML
1 INJECTION INTRAMUSCULAR
Status: DISCONTINUED | OUTPATIENT
Start: 2021-04-29 | End: 2021-05-01 | Stop reason: HOSPADM

## 2021-04-29 RX ORDER — POLYETHYLENE GLYCOL 3350 17 G/17G
17 POWDER, FOR SOLUTION ORAL DAILY PRN
Status: DISCONTINUED | OUTPATIENT
Start: 2021-04-29 | End: 2021-05-01 | Stop reason: HOSPADM

## 2021-04-29 RX ORDER — IPRATROPIUM BROMIDE AND ALBUTEROL SULFATE 2.5; .5 MG/3ML; MG/3ML
3 SOLUTION RESPIRATORY (INHALATION)
Status: DISCONTINUED | OUTPATIENT
Start: 2021-04-29 | End: 2021-05-01

## 2021-04-29 RX ORDER — SODIUM CHLORIDE 0.9 % (FLUSH) 0.9 %
5-40 SYRINGE (ML) INJECTION AS NEEDED
Status: DISCONTINUED | OUTPATIENT
Start: 2021-04-29 | End: 2021-05-01 | Stop reason: HOSPADM

## 2021-04-29 RX ORDER — ONDANSETRON 2 MG/ML
4 INJECTION INTRAMUSCULAR; INTRAVENOUS
Status: DISCONTINUED | OUTPATIENT
Start: 2021-04-29 | End: 2021-05-01 | Stop reason: HOSPADM

## 2021-04-29 RX ADMIN — IPRATROPIUM BROMIDE AND ALBUTEROL SULFATE 3 ML: .5; 3 SOLUTION RESPIRATORY (INHALATION) at 21:38

## 2021-04-29 RX ADMIN — IOPAMIDOL 100 ML: 612 INJECTION, SOLUTION INTRAVENOUS at 14:28

## 2021-04-29 RX ADMIN — SODIUM CHLORIDE 75 ML/HR: 9 INJECTION, SOLUTION INTRAVENOUS at 21:38

## 2021-04-29 RX ADMIN — Medication 10 ML: at 22:00

## 2021-04-29 RX ADMIN — PANTOPRAZOLE SODIUM 40 MG: 40 INJECTION, POWDER, FOR SOLUTION INTRAVENOUS at 13:20

## 2021-04-29 RX ADMIN — SODIUM CHLORIDE 100 ML/HR: 9 INJECTION, SOLUTION INTRAVENOUS at 15:43

## 2021-04-29 RX ADMIN — SODIUM CHLORIDE 1000 ML: 9 INJECTION, SOLUTION INTRAVENOUS at 12:55

## 2021-04-29 NOTE — ED PROVIDER NOTES
EMERGENCY DEPARTMENT HISTORY AND PHYSICAL EXAM      Date: 4/29/2021  Patient Name: Sukh Harris    History of Presenting Illness     Chief Complaint   Patient presents with    Abdominal Pain       History Provided By: Patient and EMS    HPI: Sukh Harris, 72 y.o. female with PMHx significant for hypertension, COPD, presents via EMS to the ED with cc of GI bleed. Reports she woke up from sleep around 3 AM this morning and had some lower abdominal cramping. That had a large bloody bowel movement that was bright red with some dark red clots in it. Patient does admit to daily alcohol use. Usually has about seven drinks a day. Usually drinks beer or vodka. She currently denies any abdominal pain. No further bleeding since around 6 AM.  Last colonoscopy in the system was in 2015 with four polyps three adenomatous and one hyperplastic. PMHx: Significant for COPD, hypertension  PSHx: Significant for colonoscopy, BTL, breast lumpectomy  Social Hx: Pack-a-day smoker for 45 years. Has about seven drinks a day. There are no other complaints, changes, or physical findings at this time. PCP: Carina Wade MD    No current facility-administered medications on file prior to encounter. Current Outpatient Medications on File Prior to Encounter   Medication Sig Dispense Refill    albuterol (Ventolin HFA) 90 mcg/actuation inhaler INHALE 2 PUFFS FOUR TIMES DAILY AS NEEDED 54 g 1    citalopram (CeleXA) 10 mg tablet Take 1 Tab by mouth daily. 90 Tab 1    traZODone (DESYREL) 50 mg tablet Take 1 Tab by mouth nightly. 90 Tab 1    fluticasone propionate (FLOVENT HFA) 110 mcg/actuation inhaler Take 2 Puffs by inhalation every twelve (12) hours. 3 Inhaler 1    omega 3-DHA-EPA-fish oil 1,000 mg (120 mg-180 mg) capsule Take 1 Cap by mouth daily.  vitamin e 600 unit capsule Take  by mouth daily.  Cholecalciferol, Vitamin D3, (VITAMIN D3) 1,000 unit cap Take 1 Each by mouth daily.  30 Cap 0    acetaminophen (TYLENOL) 500 mg tablet Take 500 mg by mouth every six (6) hours as needed for Pain. Past History     Past Medical History:  Past Medical History:   Diagnosis Date    Arthritis     HANDS    Breast cancer ([address] 2013    left with lumpectomy    Chronic sinusitis     Compression fracture     T7, mild on CXR    [facility]     mild on CXR    Depression     Diverticulosis     ETOH abuse     Fracture 6/6/14    LEFT 4TH TOE    Herpes simplex type 2 infection     Hyperlipidemia     Hypertension     Menopause     Psychiatric disorder     ANXIETY, DEPRESSION    Radiation therapy complication 7155    12 treatments       Past Surgical History:  Past Surgical History:   Procedure Laterality Date    HX BREAST BIOPSY Right     2 STBB benign    HX BREAST LUMPECTOMY Left 6/17/14    malignant    HX COLONOSCOPY  2010    polyps, q4y    HX COLONOSCOPY  2015    polyps x4, 3 adenomatous, 1 hyperplastic    HX GYN  1983 (AFTER HAVING LOST A CHILD)    REVERSAL TUBAL LIGATION    HX TUBAL LIGATION  1980       Family History:  Family History   Problem Relation Age of Onset    Heart Disease Father     Cancer Sister         lung    Anesth Problems Neg Hx        Social History:  Social History     Tobacco Use    Smoking status: Current Every Day Smoker     Packs/day: 1.00     Years: 45.00     Pack years: 45.00    Smokeless tobacco: Never Used    Tobacco comment: 112/20/18 patient smoking 405 cigarettes daily, she is trying to quit. Substance Use Topics    Alcohol use: No     Comment: Sober X 18 mo's (10/6/16)    Drug use: No     Comment: MARIJUANA IN DISTANT PAST       Allergies:  No Known Allergies      Review of Systems   Review of Systems   Constitutional: Negative for activity change, chills and fever. HENT: Negative for congestion and sore throat. Eyes: Negative for pain and redness.    Respiratory: Negative for cough, chest tightness and shortness of breath. Cardiovascular: Negative for chest pain and palpitations. Gastrointestinal: Positive for blood in stool. Negative for diarrhea, nausea and vomiting. Genitourinary: Negative for dysuria, frequency and urgency. Musculoskeletal: Negative for back pain and neck pain. Skin: Negative for rash. Neurological: Negative for syncope, light-headedness and headaches. Psychiatric/Behavioral: Negative for confusion. All other systems reviewed and are negative. Physical Exam   Physical Exam  Vitals signs and nursing note reviewed. Constitutional:       General: She is not in acute distress. Appearance: She is well-developed. She is not diaphoretic. Comments: Calm Pleasant white female. HENT:      Head: Normocephalic. Nose: Nose normal.      Mouth/Throat:      Pharynx: No oropharyngeal exudate. Eyes:      General: No scleral icterus. Conjunctiva/sclera: Conjunctivae normal.      Pupils: Pupils are equal, round, and reactive to light. Neck:      Musculoskeletal: Normal range of motion and neck supple. Thyroid: No thyromegaly. Vascular: No JVD. Trachea: No tracheal deviation. Cardiovascular:      Rate and Rhythm: Normal rate and regular rhythm. Heart sounds: No murmur. No friction rub. No gallop. Pulmonary:      Effort: Pulmonary effort is normal. No respiratory distress. Breath sounds: Normal breath sounds. No stridor. No wheezing or rales. Abdominal:      General: Bowel sounds are normal. There is no distension. Palpations: Abdomen is soft. Tenderness: There is no abdominal tenderness. There is no guarding or rebound. Genitourinary:     Comments: Rectal exam.  External exam within normal limits. Dark brown melanotic appearing stool in the vault. Musculoskeletal: Normal range of motion. Lymphadenopathy:      Cervical: No cervical adenopathy. Skin:     General: Skin is warm and dry. Findings: No erythema or rash. Neurological:      Mental Status: She is alert and oriented to person, place, and time. Cranial Nerves: No cranial nerve deficit. Motor: No abnormal muscle tone. Coordination: Coordination normal.   Psychiatric:         Behavior: Behavior normal.             Diagnostic Study Results     Labs -     Recent Results (from the past 12 hour(s))   CBC WITH AUTOMATED DIFF    Collection Time: 04/29/21 11:57 AM   Result Value Ref Range    WBC 6.8 3.6 - 11.0 K/uL    RBC 4.18 3.80 - 5.20 M/uL    HGB 14.9 11.5 - 16.0 g/dL    HCT 43.3 35.0 - 47.0 %    .6 (H) 80.0 - 99.0 FL    MCH 35.6 (H) 26.0 - 34.0 PG    MCHC 34.4 30.0 - 36.5 g/dL    RDW 11.9 11.5 - 14.5 %    PLATELET 489 803 - 253 K/uL    MPV 8.7 (L) 8.9 - 12.9 FL    NRBC 0.0 0  WBC    ABSOLUTE NRBC 0.00 0.00 - 0.01 K/uL    NEUTROPHILS 73 32 - 75 %    LYMPHOCYTES 20 12 - 49 %    MONOCYTES 6 5 - 13 %    EOSINOPHILS 0 0 - 7 %    BASOPHILS 0 0 - 1 %    IMMATURE GRANULOCYTES 1 (H) 0.0 - 0.5 %    ABS. NEUTROPHILS 4.9 1.8 - 8.0 K/UL    ABS. LYMPHOCYTES 1.4 0.8 - 3.5 K/UL    ABS. MONOCYTES 0.4 0.0 - 1.0 K/UL    ABS. EOSINOPHILS 0.0 0.0 - 0.4 K/UL    ABS. BASOPHILS 0.0 0.0 - 0.1 K/UL    ABS. IMM. GRANS. 0.0 0.00 - 0.04 K/UL    DF AUTOMATED     METABOLIC PANEL, COMPREHENSIVE    Collection Time: 04/29/21 11:57 AM   Result Value Ref Range    Sodium 138 136 - 145 mmol/L    Potassium 3.9 3.5 - 5.1 mmol/L    Chloride 100 97 - 108 mmol/L    CO2 26 21 - 32 mmol/L    Anion gap 12 5 - 15 mmol/L    Glucose 84 65 - 100 mg/dL    BUN 12 6 - 20 MG/DL    Creatinine 0.62 0.55 - 1.02 MG/DL    BUN/Creatinine ratio 19 12 - 20      GFR est AA >60 >60 ml/min/1.73m2    GFR est non-AA >60 >60 ml/min/1.73m2    Calcium 8.7 8.5 - 10.1 MG/DL    Bilirubin, total 0.5 0.2 - 1.0 MG/DL    ALT (SGPT) 22 12 - 78 U/L    AST (SGOT) 20 15 - 37 U/L    Alk.  phosphatase 71 45 - 117 U/L    Protein, total 6.7 6.4 - 8.2 g/dL    Albumin 3.3 (L) 3.5 - 5.0 g/dL    Globulin 3.4 2.0 - 4.0 g/dL    A-G Ratio 1.0 (L) 1.1 - 2.2     LIPASE    Collection Time: 04/29/21 11:57 AM   Result Value Ref Range    Lipase 72 (L) 73 - 393 U/L   LACTIC ACID    Collection Time: 04/29/21 11:57 AM   Result Value Ref Range    Lactic acid 3.8 (HH) 0.4 - 2.0 MMOL/L   TROPONIN I    Collection Time: 04/29/21 11:57 AM   Result Value Ref Range    Troponin-I, Qt. <0.05 <0.05 ng/mL   ETHYL ALCOHOL    Collection Time: 04/29/21 11:57 AM   Result Value Ref Range    ALCOHOL(ETHYL),SERUM 129 (H) <10 MG/DL   SAMPLES BEING HELD    Collection Time: 04/29/21 11:57 AM   Result Value Ref Range    SAMPLES BEING HELD 1SST     COMMENT        Add-on orders for these samples will be processed based on acceptable specimen integrity and analyte stability, which may vary by analyte.    OCCULT BLOOD, STOOL    Collection Time: 04/29/21 12:15 PM   Result Value Ref Range    Occult blood, stool Positive (A) NEG     URINALYSIS W/MICROSCOPIC    Collection Time: 04/29/21 12:50 PM   Result Value Ref Range    Color YELLOW/STRAW      Appearance CLEAR CLEAR      Specific gravity 1.008 1.003 - 1.030      pH (UA) 6.0 5.0 - 8.0      Protein Negative NEG mg/dL    Glucose Negative NEG mg/dL    Ketone Negative NEG mg/dL    Bilirubin Negative NEG      Blood SMALL (A) NEG      Urobilinogen 0.2 0.2 - 1.0 EU/dL    Nitrites Negative NEG      Leukocyte Esterase Negative NEG      WBC 0-4 0 - 4 /hpf    RBC 0-5 0 - 5 /hpf    Epithelial cells FEW FEW /lpf    Bacteria Negative NEG /hpf   LACTIC ACID    Collection Time: 04/29/21  1:59 PM   Result Value Ref Range    Lactic acid 2.8 (HH) 0.4 - 2.0 MMOL/L   PROTHROMBIN TIME + INR    Collection Time: 04/29/21  2:56 PM   Result Value Ref Range    INR 1.0 0.9 - 1.1      Prothrombin time 10.1 9.0 - 11.1 sec   PTT    Collection Time: 04/29/21  2:56 PM   Result Value Ref Range    aPTT 23.9 22.1 - 31.0 sec    aPTT, therapeutic range     58.0 - 77.0 SECS       Radiologic Studies -   CT ABD PELV W CONT   Final Result   Sigmoid diverticulosis without evidence of diverticulitis or other   acute abnormality with no acute findings to correlate with abdominal pain or   blood in stool otherwise. CT Results  (Last 48 hours)               04/29/21 1428  CT ABD PELV W CONT Final result    Impression:  Sigmoid diverticulosis without evidence of diverticulitis or other   acute abnormality with no acute findings to correlate with abdominal pain or   blood in stool otherwise. Narrative:  EXAM: CT ABD PELV W CONT       INDICATION: Abdominal pain. Patient woke up from sleep around 3 AM this morning   and had some lower abdominal cramping. ? Then had a large bloody bowel movement   that was bright red with some dark red clots in it. COMPARISON: CT thorax 9/5/2019. CONTRAST: 100 mL of Isovue-370. TECHNIQUE:    Following the uneventful intravenous administration of contrast, thin axial   images were obtained through the abdomen and pelvis. Coronal and sagittal   reconstructions were generated. Oral contrast was not administered. CT dose   reduction was achieved through use of a standardized protocol tailored for this   examination and automatic exposure control for dose modulation. FINDINGS:    LOWER THORAX: No significant abnormality in the incidentally imaged lower chest.   LIVER: No mass. BILIARY TREE: Gallbladder is within normal limits. CBD is not dilated. SPLEEN: within normal limits. PANCREAS: No mass or ductal dilatation. ADRENALS: Unremarkable. KIDNEYS: 1.6 cm simple appearing cyst upper pole right kidney for which no   follow-up is thought to be required. Otherwise no stone, mass, or   hydronephrosis. STOMACH: Unremarkable. SMALL BOWEL: No dilatation or wall thickening. COLON: Noninflamed appearing sigmoid diverticula. No evident wall thickening or   dilation. APPENDIX: Unremarkable. PERITONEUM: No ascites or pneumoperitoneum. RETROPERITONEUM: Atherosclerotic calcination without aneurysm or dissection.  No enlarged lymphadenopathy. REPRODUCTIVE ORGANS: Uterus and ovaries are unremarkable. URINARY BLADDER: No mass or calculus. BONES: Degenerative spine change. No acute fracture or aggressive lesion. ABDOMINAL WALL: No mass or hernia. ADDITIONAL COMMENTS: N/A               CXR Results  (Last 48 hours)    None            Medical Decision Making   I am the first provider for this patient. I reviewed the vital signs, available nursing notes, past medical history, past surgical history, family history and social history. Vital Signs-Reviewed the patient's vital signs. Patient Vitals for the past 12 hrs:   Temp Pulse Resp BP SpO2   04/29/21 1530  92 15 (!) 160/87 95 %   04/29/21 1501  94 16 (!) 169/95 95 %   04/29/21 1400  96 15 (!) 149/84 94 %   04/29/21 1330  91 17 133/80 96 %   04/29/21 1320  (!) 0  134/75 96 %   04/29/21 1140 97.6 °F (36.4 °C) (!) 102 20 (!) 138/90 95 %       Pulse Oximetry Analysis - 95% on RA    Cardiac Monitor:   Rate: 102 bpm  Rhythm: Sinus Tachycardia              Records Reviewed: Nursing Notes and Old Medical Records    Provider Notes (Medical Decision Making):   DDx: GI bleed, acute blood loss anemia    ED Course:   Initial assessment performed. The patients presenting problems have been discussed, and they are in agreement with the care plan formulated and outlined with them. I have encouraged them to ask questions as they arise throughout their visit. ED Course as of Apr 29 1537   Thu Apr 29, 2021   1456 Patient with GI bleed. Hemoglobin normal.  Initial lactate elevated to 3.8 however decreased to 2.8 after liter bolus of IV fluids. 1 another episode of bright red bloody stool in the ER. Vital signs stable. CT abdomen pelvis without acute findings. Spoke with Dr. May Acuña, MR [address] ER attending, via [facility] transfer. I reviewed the patient's history, presentation labs and imaging findings.   He accepted the patient as transfer to [facility] emergency department. [CH]      ED Course User Index  [CH] Meg Mclaughlin MD         PROGRESS NOTE    Pt reevaluated. Will transfer to [facility] ED for further evaluation of GI bleeding. Written by Gaudencio Mccoy MD                 Critical Care Time:   0    Disposition:  Transfer    PLAN:  1. Current Discharge Medication List        2. Follow-up Information    None       Return to ED if worse     Diagnosis     Clinical Impression:   1. Gastrointestinal hemorrhage, unspecified gastrointestinal hemorrhage type              Please note that this dictation was completed with BATS, the computer voice recognition software. Quite often unanticipated grammatical, syntax, homophones, and other interpretive errors are inadvertently transcribed by the computer software. Please disregard these errors. Please excuse any errors that have escaped final proofreading.

## 2021-04-29 NOTE — ED NOTES
TRANSFER - OUT REPORT:    Verbal report given to [facility]) on Chasity Munoz  being transferred to [facility] ED(unit) for routine progression of care       Report consisted of patients Situation, Background, Assessment and   Recommendations(SBAR). Information from the following report(s) SBAR, ED Summary, Intake/Output and Recent Results was reviewed with the receiving nurse. Lines:   Peripheral IV 04/29/21 Right Forearm (Active)   Site Assessment Clean, dry, & intact 04/29/21 1250   Phlebitis Assessment 0 04/29/21 1250   Dressing Status Clean, dry, & intact 04/29/21 1250   Dressing Type 4 X 4;Transparent 04/29/21 1250   Hub Color/Line Status Pink 04/29/21 1250        Opportunity for questions and clarification was provided.       P

## 2021-04-29 NOTE — ED TRIAGE NOTES
Pt arrived by EMS with complaint of bloody diarrhea. Pt reports that since 3 am this morning she has had 6 BM pt reports at times the blood in bright red, dark red and clots. Pt does admit to daily alcohol use 7 drinks mixure of beer and vodka. Pt reports this has never happened before. Pt is awake alert and oriented x 4.   Pt educated on ER flow

## 2021-04-29 NOTE — ED PROVIDER NOTES
EMERGENCY DEPARTMENT HISTORY AND PHYSICAL EXAM      Date: 4/29/2021  Patient Name: Altagracia Becerra    History of Presenting Illness     Chief Complaint   Patient presents with    Rectal Bleeding     At 0300, had a BM that consisted of red blood and blood clots       History Provided By: Patient    HPI: Altagracia Becerra, 72 y.o. female with past medical history of ETOH abuse, htn, COPD, presents to the ED with cc of lower GI bleeding, initial episode at 3 AM this morning. Patient is a transfer from outside ED. Reportedly had one large episode of bright red bloody stool in the ER. Hemodynamically stable. Normal hemoglobin at outside hospital.  CT abdomen pelvis with contrast completed without acute findings. Transferred here for admission. See H&P from Dr. Enedina Haynes from earlier today for more details. Patient currently without any complaints here- no abdominal pain, no further episodes of bleeding, no lightheadedness, dizziness, syncope, shortness of breath, palpitations.     PCP: Chula Luther MD    Current Facility-Administered Medications on File Prior to Encounter   Medication Dose Route Frequency Provider Last Rate Last Admin    [COMPLETED] sodium chloride 0.9 % bolus infusion 1,000 mL  1,000 mL IntraVENous NOW Huy Nguyen MD   Stopped at 04/29/21 1351    [COMPLETED] pantoprazole (PROTONIX) 40 mg in 0.9% sodium chloride 10 mL injection  40 mg IntraVENous ONCE Huy Nguyen MD   40 mg at 04/29/21 1320    [COMPLETED] iopamidoL (ISOVUE 300) 61 % contrast injection 100 mL  100 mL IntraVENous RAD ONCE Huy Nguyen MD   100 mL at 04/29/21 1428    [DISCONTINUED] pantoprazole (PROTONIX) injection 40 mg  40 mg IntraVENous NOW Huy Nguyen MD        [DISCONTINUED] 0.9% sodium chloride infusion  100 mL/hr IntraVENous CONTINUOUS Huy Nguyen MD   Stopped at 04/29/21 1544     Current Outpatient Medications on File Prior to Encounter   Medication Sig Dispense Refill    albuterol (Ventolin HFA) 90 mcg/actuation inhaler INHALE 2 PUFFS FOUR TIMES DAILY AS NEEDED 54 g 1    citalopram (CeleXA) 10 mg tablet Take 1 Tab by mouth daily. 90 Tab 1    traZODone (DESYREL) 50 mg tablet Take 1 Tab by mouth nightly. 90 Tab 1    fluticasone propionate (FLOVENT HFA) 110 mcg/actuation inhaler Take 2 Puffs by inhalation every twelve (12) hours. 3 Inhaler 1    omega 3-DHA-EPA-fish oil 1,000 mg (120 mg-180 mg) capsule Take 1 Cap by mouth daily.  vitamin e 600 unit capsule Take  by mouth daily.  Cholecalciferol, Vitamin D3, (VITAMIN D3) 1,000 unit cap Take 1 Each by mouth daily. 30 Cap 0    acetaminophen (TYLENOL) 500 mg tablet Take 500 mg by mouth every six (6) hours as needed for Pain.          Past History     Past Medical History:  Past Medical History:   Diagnosis Date    Arthritis     HANDS    Breast cancer ([facility] [address] 2013    left with lumpectomy    Chronic sinusitis     Compression fracture     T7, mild on CXR    COPD (chronic obstructive pulmonary disease) (HCC)     mild on CXR    Depression     Diverticulosis     ETOH abuse     Fracture 6/6/14    LEFT 4TH TOE    Herpes simplex type 2 infection     Hyperlipidemia     Hypertension     Menopause     Psychiatric disorder     ANXIETY, DEPRESSION    Radiation therapy complication 8776    12 treatments       Past Surgical History:  Past Surgical History:   Procedure Laterality Date    HX BREAST BIOPSY Right     2 STBB benign    HX BREAST LUMPECTOMY Left 6/17/14    malignant    HX COLONOSCOPY  2010    polyps, q4y    HX COLONOSCOPY  2015    polyps x4, 3 adenomatous, 1 hyperplastic    HX GYN  1983 (AFTER HAVING LOST A CHILD)    REVERSAL TUBAL LIGATION    HX TUBAL LIGATION  1980       Family History:  Family History   Problem Relation Age of Onset    Heart Disease Father     Cancer Sister         lung    Anesth Problems Neg Hx        Social History:  Social History     Tobacco Use    Smoking status: Current Every Day Smoker Packs/day: 1.00     Years: 45.00     Pack years: 45.00    Smokeless tobacco: Never Used    Tobacco comment: 112/20/18 patient smoking 405 cigarettes daily, she is trying to quit. Substance Use Topics    Alcohol use: No     Comment: Sober X 18 mo's (10/6/16)    Drug use: No     Comment: MARIJUANA IN DISTANT PAST       Allergies:  No Known Allergies      Review of Systems   Review of Systems   Constitutional: Negative for chills and fever. HENT: Negative for congestion and rhinorrhea. Eyes: Negative for visual disturbance. Respiratory: Negative for chest tightness and shortness of breath. Gastrointestinal: Positive for blood in stool. Negative for abdominal pain, diarrhea, nausea and vomiting. Genitourinary: Negative for dysuria, flank pain and hematuria. Musculoskeletal: Negative for back pain and neck pain. Skin: Negative for rash. Allergic/Immunologic: Negative for immunocompromised state. Neurological: Negative for dizziness, speech difficulty, weakness and headaches. Hematological: Negative for adenopathy. Psychiatric/Behavioral: Negative for dysphoric mood and suicidal ideas. Physical Exam   Physical Exam  Vitals signs and nursing note reviewed. Constitutional:       General: She is not in acute distress. Appearance: She is not ill-appearing or toxic-appearing. HENT:      Head: Normocephalic and atraumatic. Nose: Nose normal.      Mouth/Throat:      Mouth: Mucous membranes are moist.   Eyes:      Extraocular Movements: Extraocular movements intact. Pupils: Pupils are equal, round, and reactive to light. Neck:      Musculoskeletal: Normal range of motion and neck supple. Cardiovascular:      Rate and Rhythm: Normal rate and regular rhythm. Pulses: Normal pulses. Pulmonary:      Effort: Pulmonary effort is normal.      Breath sounds: No stridor. No wheezing or rhonchi. Abdominal:      General: Abdomen is flat. There is no distension. Palpations: Abdomen is soft. Tenderness: There is no abdominal tenderness. Musculoskeletal: Normal range of motion. Skin:     General: Skin is warm and dry. Neurological:      General: No focal deficit present. Mental Status: She is alert and oriented to person, place, and time. Psychiatric:         Judgment: Judgment normal.         Diagnostic Study Results     Labs -     Recent Results (from the past 24 hour(s))   CBC WITH AUTOMATED DIFF    Collection Time: 04/29/21 11:57 AM   Result Value Ref Range    WBC 6.8 3.6 - 11.0 K/uL    RBC 4.18 3.80 - 5.20 M/uL    HGB 14.9 11.5 - 16.0 g/dL    HCT 43.3 35.0 - 47.0 %    .6 (H) 80.0 - 99.0 FL    MCH 35.6 (H) 26.0 - 34.0 PG    MCHC 34.4 30.0 - 36.5 g/dL    RDW 11.9 11.5 - 14.5 %    PLATELET 837 851 - 615 K/uL    MPV 8.7 (L) 8.9 - 12.9 FL    NRBC 0.0 0  WBC    ABSOLUTE NRBC 0.00 0.00 - 0.01 K/uL    NEUTROPHILS 73 32 - 75 %    LYMPHOCYTES 20 12 - 49 %    MONOCYTES 6 5 - 13 %    EOSINOPHILS 0 0 - 7 %    BASOPHILS 0 0 - 1 %    IMMATURE GRANULOCYTES 1 (H) 0.0 - 0.5 %    ABS. NEUTROPHILS 4.9 1.8 - 8.0 K/UL    ABS. LYMPHOCYTES 1.4 0.8 - 3.5 K/UL    ABS. MONOCYTES 0.4 0.0 - 1.0 K/UL    ABS. EOSINOPHILS 0.0 0.0 - 0.4 K/UL    ABS. BASOPHILS 0.0 0.0 - 0.1 K/UL    ABS. IMM. GRANS. 0.0 0.00 - 0.04 K/UL    DF AUTOMATED     METABOLIC PANEL, COMPREHENSIVE    Collection Time: 04/29/21 11:57 AM   Result Value Ref Range    Sodium 138 136 - 145 mmol/L    Potassium 3.9 3.5 - 5.1 mmol/L    Chloride 100 97 - 108 mmol/L    CO2 26 21 - 32 mmol/L    Anion gap 12 5 - 15 mmol/L    Glucose 84 65 - 100 mg/dL    BUN 12 6 - 20 MG/DL    Creatinine 0.62 0.55 - 1.02 MG/DL    BUN/Creatinine ratio 19 12 - 20      GFR est AA >60 >60 ml/min/1.73m2    GFR est non-AA >60 >60 ml/min/1.73m2    Calcium 8.7 8.5 - 10.1 MG/DL    Bilirubin, total 0.5 0.2 - 1.0 MG/DL    ALT (SGPT) 22 12 - 78 U/L    AST (SGOT) 20 15 - 37 U/L    Alk.  phosphatase 71 45 - 117 U/L    Protein, total 6.7 6.4 - 8.2 g/dL    Albumin 3.3 (L) 3.5 - 5.0 g/dL    Globulin 3.4 2.0 - 4.0 g/dL    A-G Ratio 1.0 (L) 1.1 - 2.2     LIPASE    Collection Time: 04/29/21 11:57 AM   Result Value Ref Range    Lipase 72 (L) 73 - 393 U/L   LACTIC ACID    Collection Time: 04/29/21 11:57 AM   Result Value Ref Range    Lactic acid 3.8 (HH) 0.4 - 2.0 MMOL/L   TROPONIN I    Collection Time: 04/29/21 11:57 AM   Result Value Ref Range    Troponin-I, Qt. <0.05 <0.05 ng/mL   ETHYL ALCOHOL    Collection Time: 04/29/21 11:57 AM   Result Value Ref Range    ALCOHOL(ETHYL),SERUM 129 (H) <10 MG/DL   SAMPLES BEING HELD    Collection Time: 04/29/21 11:57 AM   Result Value Ref Range    SAMPLES BEING HELD 1SST     COMMENT        Add-on orders for these samples will be processed based on acceptable specimen integrity and analyte stability, which may vary by analyte.    OCCULT BLOOD, STOOL    Collection Time: 04/29/21 12:15 PM   Result Value Ref Range    Occult blood, stool Positive (A) NEG     URINALYSIS W/MICROSCOPIC    Collection Time: 04/29/21 12:50 PM   Result Value Ref Range    Color YELLOW/STRAW      Appearance CLEAR CLEAR      Specific gravity 1.008 1.003 - 1.030      pH (UA) 6.0 5.0 - 8.0      Protein Negative NEG mg/dL    Glucose Negative NEG mg/dL    Ketone Negative NEG mg/dL    Bilirubin Negative NEG      Blood SMALL (A) NEG      Urobilinogen 0.2 0.2 - 1.0 EU/dL    Nitrites Negative NEG      Leukocyte Esterase Negative NEG      WBC 0-4 0 - 4 /hpf    RBC 0-5 0 - 5 /hpf    Epithelial cells FEW FEW /lpf    Bacteria Negative NEG /hpf   LACTIC ACID    Collection Time: 04/29/21  1:59 PM   Result Value Ref Range    Lactic acid 2.8 (HH) 0.4 - 2.0 MMOL/L   PROTHROMBIN TIME + INR    Collection Time: 04/29/21  2:56 PM   Result Value Ref Range    INR 1.0 0.9 - 1.1      Prothrombin time 10.1 9.0 - 11.1 sec   PTT    Collection Time: 04/29/21  2:56 PM   Result Value Ref Range    aPTT 23.9 22.1 - 31.0 sec    aPTT, therapeutic range     58.0 - 77.0 SECS   CBC WITH AUTOMATED DIFF    Collection Time: 04/29/21  6:41 PM   Result Value Ref Range    WBC 6.5 3.6 - 11.0 K/uL    RBC 3.88 3.80 - 5.20 M/uL    HGB 13.9 11.5 - 16.0 g/dL    HCT 40.3 35.0 - 47.0 %    .9 (H) 80.0 - 99.0 FL    MCH 35.8 (H) 26.0 - 34.0 PG    MCHC 34.5 30.0 - 36.5 g/dL    RDW 12.0 11.5 - 14.5 %    PLATELET 727 837 - 514 K/uL    MPV 8.7 (L) 8.9 - 12.9 FL    NRBC 0.0 0  WBC    ABSOLUTE NRBC 0.00 0.00 - 0.01 K/uL    NEUTROPHILS 67 32 - 75 %    LYMPHOCYTES 22 12 - 49 %    MONOCYTES 9 5 - 13 %    EOSINOPHILS 1 0 - 7 %    BASOPHILS 0 0 - 1 %    IMMATURE GRANULOCYTES 1 (H) 0.0 - 0.5 %    ABS. NEUTROPHILS 4.4 1.8 - 8.0 K/UL    ABS. LYMPHOCYTES 1.4 0.8 - 3.5 K/UL    ABS. MONOCYTES 0.6 0.0 - 1.0 K/UL    ABS. EOSINOPHILS 0.0 0.0 - 0.4 K/UL    ABS. BASOPHILS 0.0 0.0 - 0.1 K/UL    ABS. IMM. GRANS. 0.0 0.00 - 0.04 K/UL    DF AUTOMATED         Radiologic Studies -   No orders to display     CT Results  (Last 48 hours)               04/29/21 1428  CT ABD PELV W CONT Final result    Impression:  Sigmoid diverticulosis without evidence of diverticulitis or other   acute abnormality with no acute findings to correlate with abdominal pain or   blood in stool otherwise. Narrative:  EXAM: CT ABD PELV W CONT       INDICATION: Abdominal pain. Patient woke up from sleep around 3 AM this morning   and had some lower abdominal cramping. ? Then had a large bloody bowel movement   that was bright red with some dark red clots in it. COMPARISON: CT thorax 9/5/2019. CONTRAST: 100 mL of Isovue-370. TECHNIQUE:    Following the uneventful intravenous administration of contrast, thin axial   images were obtained through the abdomen and pelvis. Coronal and sagittal   reconstructions were generated. Oral contrast was not administered. CT dose   reduction was achieved through use of a standardized protocol tailored for this   examination and automatic exposure control for dose modulation.        FINDINGS:    LOWER THORAX: No significant abnormality in the incidentally imaged lower chest.   LIVER: No mass. BILIARY TREE: Gallbladder is within normal limits. CBD is not dilated. SPLEEN: within normal limits. PANCREAS: No mass or ductal dilatation. ADRENALS: Unremarkable. KIDNEYS: 1.6 cm simple appearing cyst upper pole right kidney for which no   follow-up is thought to be required. Otherwise no stone, mass, or   hydronephrosis. STOMACH: Unremarkable. SMALL BOWEL: No dilatation or wall thickening. COLON: Noninflamed appearing sigmoid diverticula. No evident wall thickening or   dilation. APPENDIX: Unremarkable. PERITONEUM: No ascites or pneumoperitoneum. RETROPERITONEUM: Atherosclerotic calcination without aneurysm or dissection. No   enlarged lymphadenopathy. REPRODUCTIVE ORGANS: Uterus and ovaries are unremarkable. URINARY BLADDER: No mass or calculus. BONES: Degenerative spine change. No acute fracture or aggressive lesion. ABDOMINAL WALL: No mass or hernia. ADDITIONAL COMMENTS: N/A               CXR Results  (Last 48 hours)    None          Medical Decision Making   I am the first provider for this patient. I reviewed the vital signs, available nursing notes, past medical history, past surgical history, family history and social history. Vital Signs-Reviewed the patient's vital signs.   Patient Vitals for the past 24 hrs:   Temp Pulse Resp BP SpO2   05/01/21 0005 98 °F (36.7 °C) 78 17 (!) 177/81 96 %   04/30/21 2338    (!) 177/81    04/30/21 2034 97.8 °F (36.6 °C) 86 18 (!) 177/82 96 %   04/30/21 1640 98.2 °F (36.8 °C) 87 18 (!) 141/84 95 %   04/30/21 1600  87      04/30/21 1305 97.9 °F (36.6 °C) 89 18 (!) 154/88 95 %   04/30/21 0959 97.4 °F (36.3 °C) 89 18 (!) 147/75 94 %   04/30/21 0805     92 %   04/30/21 0600  79 24 (!) 160/77 92 %   04/30/21 0300 98.4 °F (36.9 °C) 84 21 (!) 155/73 92 %   04/30/21 0145  86 22 139/65 90 %     Records Reviewed: Nursing Notes and Old Medical Records    Provider Notes (Medical Decision Making):   70-year-old female presenting as a transfer from outside ED for bright red blood per rectum since earlier today. She is hemodynamically stable in the ED, not tachycardic. In no acute distress on exam.  Abdominal exam is benign. Repeat H&H here again stable with hemoglobin at 12.5, hematocrit of 36.6. Lactic acidosis at outside ED, but repeat lactic acid here normal at 0.8. Patient without further episodes of bleeding during her ED course here today. She is discussed with the hospitalist, and admitted in stable condition for further management with H&H checks, GI consult and possible scope. Sae Colon MD      Disposition:  Admit      Diagnosis     Clinical Impression:   1. Lower GI bleeding        Attestations:    Sae Colon MD    Please note that this dictation was completed with [employer], the computer voice recognition software. Quite often unanticipated grammatical, syntax, homophones, and other interpretive errors are inadvertently transcribed by the computer software. Please disregard these errors. Please excuse any errors that have escaped final proofreading. Thank you.

## 2021-04-29 NOTE — PROGRESS NOTES
Called [facility] and arranged ALS transport for this patient to [facility]. Informed there are no isolation precautions. Requested information provided. ETA [id number]-- HOA Skinner made aware.

## 2021-04-29 NOTE — TELEPHONE ENCOUNTER
Patient calls to state that she has had diarrhea since 3am, all blood, and bp is dropping. I insisted that she call EMS and be transported to ED immediately.

## 2021-04-29 NOTE — ED TRIAGE NOTES
Pt arrived by EMS with complaint of blood in stool and diarrhea.   Pt arrived awake alert and oriented x 4  texting on phone

## 2021-04-30 LAB
ERYTHROCYTE [DISTWIDTH] IN BLOOD BY AUTOMATED COUNT: 11.9 % (ref 11.5–14.5)
HCT VFR BLD AUTO: 36.3 % (ref 35–47)
HCT VFR BLD AUTO: 36.6 % (ref 35–47)
HGB BLD-MCNC: 12.5 G/DL (ref 11.5–16)
HGB BLD-MCNC: 12.9 G/DL (ref 11.5–16)
MCH RBC QN AUTO: 35.5 PG (ref 26–34)
MCHC RBC AUTO-ENTMCNC: 34.2 G/DL (ref 30–36.5)
MCV RBC AUTO: 104 FL (ref 80–99)
NRBC # BLD: 0 K/UL (ref 0–0.01)
NRBC BLD-RTO: 0 PER 100 WBC
PLATELET # BLD AUTO: 167 K/UL (ref 150–400)
PMV BLD AUTO: 8.8 FL (ref 8.9–12.9)
RBC # BLD AUTO: 3.52 M/UL (ref 3.8–5.2)
WBC # BLD AUTO: 5.6 K/UL (ref 3.6–11)

## 2021-04-30 PROCEDURE — 36415 COLL VENOUS BLD VENIPUNCTURE: CPT

## 2021-04-30 PROCEDURE — 74011250637 HC RX REV CODE- 250/637: Performed by: STUDENT IN AN ORGANIZED HEALTH CARE EDUCATION/TRAINING PROGRAM

## 2021-04-30 PROCEDURE — 74011000250 HC RX REV CODE- 250: Performed by: STUDENT IN AN ORGANIZED HEALTH CARE EDUCATION/TRAINING PROGRAM

## 2021-04-30 PROCEDURE — 94640 AIRWAY INHALATION TREATMENT: CPT

## 2021-04-30 PROCEDURE — 65660000000 HC RM CCU STEPDOWN

## 2021-04-30 PROCEDURE — 74011250637 HC RX REV CODE- 250/637: Performed by: NURSE PRACTITIONER

## 2021-04-30 PROCEDURE — 85027 COMPLETE CBC AUTOMATED: CPT

## 2021-04-30 PROCEDURE — 85018 HEMOGLOBIN: CPT

## 2021-04-30 RX ORDER — HYDRALAZINE HYDROCHLORIDE 20 MG/ML
10 INJECTION INTRAMUSCULAR; INTRAVENOUS
Status: DISCONTINUED | OUTPATIENT
Start: 2021-04-30 | End: 2021-05-01 | Stop reason: HOSPADM

## 2021-04-30 RX ADMIN — IPRATROPIUM BROMIDE AND ALBUTEROL SULFATE 3 ML: .5; 3 SOLUTION RESPIRATORY (INHALATION) at 13:51

## 2021-04-30 RX ADMIN — BUDESONIDE 250 MCG: 0.25 INHALANT RESPIRATORY (INHALATION) at 08:04

## 2021-04-30 RX ADMIN — IPRATROPIUM BROMIDE AND ALBUTEROL SULFATE 3 ML: .5; 3 SOLUTION RESPIRATORY (INHALATION) at 08:05

## 2021-04-30 RX ADMIN — TRAZODONE HYDROCHLORIDE 50 MG: 50 TABLET ORAL at 21:22

## 2021-04-30 RX ADMIN — Medication 10 ML: at 07:17

## 2021-04-30 RX ADMIN — Medication 10 ML: at 17:11

## 2021-04-30 RX ADMIN — CITALOPRAM HYDROBROMIDE 10 MG: 20 TABLET ORAL at 09:00

## 2021-04-30 RX ADMIN — Medication 10 ML: at 21:22

## 2021-04-30 NOTE — PROGRESS NOTES
Hospitalist Progress Note    NAME: Chasity Munoz   :  1955   MRN:  [id number]       Assessment / Plan:  Bright red bleeding per rectum POA     CT abdomen showed sigmoid diverticulosis without diverticulitis, no active bleeding  Hemoglobin is stable  CBC every 12  GI to see  NPO until seen by GI     Lactic acidosis POA, resolved  No obvious focus of infection, UA clear follow-up chest x-ray  No belly pain, CT abdomen unremarkable  Repeat lactate normal     COPD  Not in exacerbation  Continue with duo nebs and Pulmicort     Active smoker  EtOH abuse  CIWA protocol, risk of withdrawal     Depression  Continue with home meds     Code Status: Full code     DVT Prophylaxis: SCDs only  GI Prophylaxis: Protonix     Baseline: Ambulatory     Subjective:     Chief Complaint / Reason for Physician Visit  Had small BM with BRBPR. No dizziness    Review of Systems:  Symptom Y/N Comments  Symptom Y/N Comments   Fever/Chills    Chest Pain     Poor Appetite    Edema     Cough    Abdominal Pain     Sputum    Joint Pain     SOB/CHARLTON    Pruritis/Rash     Nausea/vomit    Tolerating PT/OT     Diarrhea    Tolerating Diet     Constipation    Other       Could NOT obtain due to:      Objective:     VITALS:   Last 24hrs VS reviewed since prior progress note.  Most recent are:  Patient Vitals for the past 24 hrs:   Temp Pulse Resp BP SpO2   21 1305 97.9 °F (36.6 °C) 89 18 (!) 154/88 95 %   21 0959 97.4 °F (36.3 °C) 89 18 (!) 147/75 94 %   21 0805     92 %   21 0600  79 24 (!) 160/77 92 %   21 0300 98.4 °F (36.9 °C) 84 21 (!) 155/73 92 %   21 0145  86 22 139/65 90 %   21 2130  100 21 (!) 158/75 93 %   21 2000 98.7 °F (37.1 °C) 90 18 (!) 158/79 92 %   21 1816 98.8 °F (37.1 °C) 94 18 (!) 177/93 93 %     No intake or output data in the 24 hours ending 21 1420     I had a face to face encounter and independently examined this patient on 2021, as outlined below:  PHYSICAL EXAM:  General: WD, WN. Alert, cooperative, no acute distress    EENT:  EOMI. Anicteric sclerae. MMM  Resp:  CTA bilaterally, no wheezing or rales. No accessory muscle use  CV:  Regular  rhythm,  No edema  GI:  Soft, Non distended, Non tender. +Bowel sounds  Neurologic:  Alert and oriented X 3, normal speech,   Psych:   Good insight. Not anxious nor agitated  Skin:  No rashes. No jaundice    Reviewed most current lab test results and cultures  YES  Reviewed most current radiology test results   YES  Review and summation of old records today    NO  Reviewed patient's current orders and MAR    YES  PMH/SH reviewed - no change compared to H&P  ________________________________________________________________________  Care Plan discussed with:    Comments   Patient x    Family      RN x    Care Manager     Consultant                        Multidiciplinary team rounds were held today with , nursing, pharmacist and clinical coordinator. Patient's plan of care was discussed; medications were reviewed and discharge planning was addressed. ________________________________________________________________________  Total NON critical care TIME:  31  Minutes    Total CRITICAL CARE TIME Spent:   Minutes non procedure based      Comments   >50% of visit spent in counseling and coordination of care     ________________________________________________________________________  Manuel Marquez MD     Procedures: see electronic medical records for all procedures/Xrays and details which were not copied into this note but were reviewed prior to creation of Plan. LABS:  I reviewed today's most current labs and imaging studies.   Pertinent labs include:  Recent Labs     04/30/21  0304 04/29/21  1841 04/29/21  1157   WBC 5.6 6.5 6.8   HGB 12.5 13.9 14.9   HCT 36.6 40.3 43.3    172 186     Recent Labs     04/29/21 2015 04/29/21  1456 04/29/21  1157     --  138   K 4.1  --  3.9    --  100   CO2 25  --  26   GLU 77  --  84   BUN 10  --  12   CREA 0.34*  --  0.62   CA 8.4*  --  8.7   ALB 3.0*  --  3.3*   TBILI 0.8  --  0.5   ALT 19  --  22   INR  --  1.0  --        Signed: Felipe Sloan MD

## 2021-04-30 NOTE — CONSULTS
GI CONSULTATION NOTE  Maddie Méndez NP  [phone] NP in-hospital cell phone M-F until 4:30  After 5pm or on weekends, please call  for physician on call    NAME: Nate Kwok   :  1955   MRN:  [id number]   Attending:  Dr. Santosh Valdovinos  Primary GI:  Dr. Shaheen Leach  Date/Time:  2021 2:48 PM    Assessment:   BRBPR  Diverticulosis  · Hgb 12.5, WNL  · FOBT +  · CT abd/pel:  Sigmoid diverticulosis without evidence of diverticulitis or other acute abnormality with no acute findings to correlate with abdominal pain or blood in stool otherwise. · States last CLN was ~ 5 years ago in [facility]    COPD  Tobacco use  ETOH abuse  · Treatment per primary team   · Alcohol serum 129    Plan:   · If re-bleeding occurs, would recommend stat CTA. If bleeding noted on imaging, consult IR for possible embolization  · Monitor for s/s of bleeding; transfuse as clinically indicated  · Follow H&H; transfuse for Hgb <7.0  · Recommend outpatient colonoscopy   · Symptomatic care per primary team   · Will see patient over the weekend by request.  Please call with any questions or concerns. Will follow-up with patient on Monday, if still admitted  Plan discussed with Dr. Levy Genera:     HISTORY OF PRESENT ILLNESS:     Nate Kwok is an 72 y.o.  female who we are asked to see for complaint of BRBPR. The patient presents with a PMH of EtOH abuse, COPD, depression presented with bright red bleeding per rectum. She went to [address] [place] [address] ED with bright red bleeding per rectum and was transferred here. She woke up this morning at 3 AM and had bright red bleeding per rectum. She also had another episode in outside hospital.  She denies any belly pain, fever, chest pain. She mentions that she always has cough and has chronic shortness of breath on exertion. She denies any lower extremity edema or tenderness.     Past Medical History:   Diagnosis Date    Arthritis     HANDS    Breast cancer ([facility] 2013    left with lumpectomy    Chronic sinusitis     Compression fracture     T7, mild on CXR    COPD (chronic obstructive pulmonary disease) (HCC)     mild on CXR    Depression     Diverticulosis     ETOH abuse     Fracture 6/6/14    LEFT 4TH TOE    Herpes simplex type 2 infection     Hyperlipidemia     Hypertension     Menopause     Psychiatric disorder     ANXIETY, DEPRESSION    Radiation therapy complication 7548    12 treatments      Past Surgical History:   Procedure Laterality Date    HX BREAST BIOPSY Right     2 STBB benign    HX BREAST LUMPECTOMY Left 6/17/14    malignant    HX COLONOSCOPY  2010    polyps, q4y    HX COLONOSCOPY  2015    polyps x4, 3 adenomatous, 1 hyperplastic    HX GYN  1983 (AFTER HAVING LOST A CHILD)    REVERSAL TUBAL LIGATION    HX TUBAL LIGATION  1980     Social History     Tobacco Use    Smoking status: Current Every Day Smoker     Packs/day: 1.00     Years: 45.00     Pack years: 45.00    Smokeless tobacco: Never Used    Tobacco comment: 112/20/18 patient smoking 405 cigarettes daily, she is trying to quit. Substance Use Topics    Alcohol use: No     Comment: Sober X 18 mo's (10/6/16)      Family History   Problem Relation Age of Onset    Heart Disease Father     Cancer Sister         lung    Anesth Problems Neg Hx       No Known Allergies   Prior to Admission medications    Medication Sig Start Date End Date Taking? Authorizing Provider   albuterol (Ventolin HFA) 90 mcg/actuation inhaler INHALE 2 PUFFS FOUR TIMES DAILY AS NEEDED 4/23/21   Bonnie Wills MD   citalopram (CeleXA) 10 mg tablet Take 1 Tab by mouth daily. 11/23/20   Sade Wills MD   traZODone (DESYREL) 50 mg tablet Take 1 Tab by mouth nightly. 11/23/20   Sade Wills MD   fluticasone propionate (FLOVENT HFA) 110 mcg/actuation inhaler Take 2 Puffs by inhalation every twelve (12) hours.  11/23/20   Bonnie Wills MD   omega 8-VFO-IXB-fish oil 1,000 mg (120 mg-180 mg) capsule Take 1 Cap by mouth daily. Provider, Historical   vitamin e 600 unit capsule Take  by mouth daily. Provider, Historical   Cholecalciferol, Vitamin D3, (VITAMIN D3) 1,000 unit cap Take 1 Each by mouth daily. 6/16/15   Wolf-Small, Claud Cowden, MD   acetaminophen (TYLENOL) 500 mg tablet Take 500 mg by mouth every six (6) hours as needed for Pain. Provider, Historical       Patient Active Problem List   Diagnosis Code    [facility] C50.912    Hypertension I10    Depression F32.9    Hyperlipidemia E78.5    COPD (chronic obstructive pulmonary disease) ([facility] J44.9    Lower GI bleeding K92.2       REVIEW OF SYSTEMS:    Constitutional: negative fever, negative chills, negative weight loss  Eyes:   negative visual changes  ENT:   negative sore throat, tongue or lip swelling   Respiratory:  negative cough, negative dyspnea  Cards:  negative for chest pain, palpitations, lower extremity edema  GI:   See HPI  :  negative for frequency, dysuria  Integument:  negative for rash and pruritus  Heme:  negative for easy bruising and gum/nose bleeding  Musculoskel: negative for myalgias,  back pain and muscle weakness  Neuro: negative for headaches, dizziness, vertigo  Psych: negative for feelings of anxiety, depression     Objective:   VITALS:    Visit Vitals  BP (!) 154/88 (BP 1 Location: Left upper arm, BP Patient Position: At rest)   Pulse 89   Temp 97.9 °F (36.6 °C)   Resp 18   SpO2 95%       PHYSICAL EXAM:   General:           Pleasant  female. NAD    Head:               Normocephalic, without obvious abnormality, atraumatic. Eyes:               Conjunctivae clear and pale, anicteric sclerae. Pupils are equal  Nose:               Nares normal. No drainage or sinus tenderness.   Throat:             Lips, mucosa, and tongue normal.  No Thrush  Neck:               Supple, symmetrical,  no adenopathy, thyroid: non tender  Back:               Symmetric,  No CVA tenderness. Lungs:             CTA bilaterally. No wheezing/rhonchi/rales. Chest wall:      No tenderness or deformity. No Accessory muscle use. Heart:              Regular rate and rhythm,  no murmur, rub or gallop. Abdomen:        Soft, non-tender. Not distended. Bowel sounds normal. No masses  Extremities:     Atraumatic, No cyanosis. No edema. No clubbing  Skin:                Texture, turgor normal. No rashes/lesions/jaundice  Psych:             Good insight. Not depressed. Not anxious or agitated. Neurologic:      EOMs intact. No facial asymmetry. No aphasia or slurred speech. Normal                        strength, A/O X 3. LAB DATA REVIEWED:    Recent Results (from the past 24 hour(s))   PROTHROMBIN TIME + INR    Collection Time: 04/29/21  2:56 PM   Result Value Ref Range    INR 1.0 0.9 - 1.1      Prothrombin time 10.1 9.0 - 11.1 sec   PTT    Collection Time: 04/29/21  2:56 PM   Result Value Ref Range    aPTT 23.9 22.1 - 31.0 sec    aPTT, therapeutic range     58.0 - 77.0 SECS   CBC WITH AUTOMATED DIFF    Collection Time: 04/29/21  6:41 PM   Result Value Ref Range    WBC 6.5 3.6 - 11.0 K/uL    RBC 3.88 3.80 - 5.20 M/uL    HGB 13.9 11.5 - 16.0 g/dL    HCT 40.3 35.0 - 47.0 %    .9 (H) 80.0 - 99.0 FL    MCH 35.8 (H) 26.0 - 34.0 PG    MCHC 34.5 30.0 - 36.5 g/dL    RDW 12.0 11.5 - 14.5 %    PLATELET 049 833 - 507 K/uL    MPV 8.7 (L) 8.9 - 12.9 FL    NRBC 0.0 0  WBC    ABSOLUTE NRBC 0.00 0.00 - 0.01 K/uL    NEUTROPHILS 67 32 - 75 %    LYMPHOCYTES 22 12 - 49 %    MONOCYTES 9 5 - 13 %    EOSINOPHILS 1 0 - 7 %    BASOPHILS 0 0 - 1 %    IMMATURE GRANULOCYTES 1 (H) 0.0 - 0.5 %    ABS. NEUTROPHILS 4.4 1.8 - 8.0 K/UL    ABS. LYMPHOCYTES 1.4 0.8 - 3.5 K/UL    ABS. MONOCYTES 0.6 0.0 - 1.0 K/UL    ABS. EOSINOPHILS 0.0 0.0 - 0.4 K/UL    ABS. BASOPHILS 0.0 0.0 - 0.1 K/UL    ABS. IMM.  GRANS. 0.0 0.00 - 0.04 K/UL    DF AUTOMATED     METABOLIC PANEL, COMPREHENSIVE    Collection Time: 04/29/21 8:15 PM   Result Value Ref Range    Sodium 136 136 - 145 mmol/L    Potassium 4.1 3.5 - 5.1 mmol/L    Chloride 103 97 - 108 mmol/L    CO2 25 21 - 32 mmol/L    Anion gap 8 5 - 15 mmol/L    Glucose 77 65 - 100 mg/dL    BUN 10 6 - 20 MG/DL    Creatinine 0.34 (L) 0.55 - 1.02 MG/DL    BUN/Creatinine ratio 29 (H) 12 - 20      GFR est AA >60 >60 ml/min/1.73m2    GFR est non-AA >60 >60 ml/min/1.73m2    Calcium 8.4 (L) 8.5 - 10.1 MG/DL    Bilirubin, total 0.8 0.2 - 1.0 MG/DL    ALT (SGPT) 19 12 - 78 U/L    AST (SGOT) 15 15 - 37 U/L    Alk.  phosphatase 65 45 - 117 U/L    Protein, total 6.1 (L) 6.4 - 8.2 g/dL    Albumin 3.0 (L) 3.5 - 5.0 g/dL    Globulin 3.1 2.0 - 4.0 g/dL    A-G Ratio 1.0 (L) 1.1 - 2.2     LACTIC ACID    Collection Time: 04/29/21  8:15 PM   Result Value Ref Range    Lactic acid 0.8 0.4 - 2.0 MMOL/L   TYPE & SCREEN    Collection Time: 04/29/21  8:15 PM   Result Value Ref Range    Crossmatch Expiration 05/02/2021,2359     ABO/Rh(D) A POSITIVE     Antibody screen NEG    CBC W/O DIFF    Collection Time: 04/30/21  3:04 AM   Result Value Ref Range    WBC 5.6 3.6 - 11.0 K/uL    RBC 3.52 (L) 3.80 - 5.20 M/uL    HGB 12.5 11.5 - 16.0 g/dL    HCT 36.6 35.0 - 47.0 %    .0 (H) 80.0 - 99.0 FL    MCH 35.5 (H) 26.0 - 34.0 PG    MCHC 34.2 30.0 - 36.5 g/dL    RDW 11.9 11.5 - 14.5 %    PLATELET 711 014 - 184 K/uL    MPV 8.8 (L) 8.9 - 12.9 FL    NRBC 0.0 0  WBC    ABSOLUTE NRBC 0.00 0.00 - 0.01 K/uL       IMAGING RESULTS:  I have personally reviewed the imaging reports      Total time spent with patient: 50 minutes ________________________________________________________________________  Care Plan discussed with:  Patient x   Family     RN x              Consultant:  Hospitalist      CT  4/30/2021:  ________________________________________________________________________    ___________________________________________________  Consulting Provider: Armando Fuentes NP      4/30/2021  2:48 PM

## 2021-04-30 NOTE — ED NOTES
After reviewing labs drawn at previous facility, Lactate was noted to be elevated. Notified MD, will redraw repeat. T/S obtained. 2150- IVF infusing. Neb given. Notified MD pt will require a nicotine patch.

## 2021-04-30 NOTE — H&P
Hospitalist Admission Note    NAME: Eliz Webb   :  1955   MRN:  [id number]     Date/Time:  2021 9:04 PM    Patient PCP: Venkata Herrera MD  ______________________________________________________________________  Given the patient's current clinical presentation, I have a high level of concern for decompensation if discharged from the emergency department. Complex decision making was performed, which includes reviewing the patient's available past medical records, laboratory results, and x-ray films. My assessment of this patient's clinical condition and my plan of care is as follows. Assessment / Plan:  Bright red bleeding per rectum POA    CT abdomen showed sigmoid diverticulosis without diverticulitis, no active bleeding  Hemoglobin is stable  CBC every 12  GI follow-up    Lactic acidosis POA  No obvious focus of infection, UA clear follow-up chest x-ray  No belly pain, CT abdomen unremarkable  Repeat lactate normal    COPD  Not in exacerbation  Continue with duo nebs and Pulmicort    Active smoker  EtOH abuse  CIWA protocol, risk of withdrawal    Depression  Continue with home meds    Code Status: Full code    DVT Prophylaxis: SCDs only  GI Prophylaxis: Protonix    Baseline: Ambulatory      Subjective:   CHIEF COMPLAINT: Bright red bleeding per rectum    HISTORY OF PRESENT ILLNESS:     Shelly Alvarado is a 72 y.o. female with past medical history of EtOH abuse, COPD, depression presented with bright red bleeding per rectum. She went to [address] [place] [address] ED with bright red bleeding per rectum and was transferred here. She woke up this morning at 3 AM and had bright red bleeding per rectum. She also had another episode in outside hospital.  She denies any belly pain, fever, chest pain. She mentions that she always has cough and has chronic shortness of breath on exertion. She denies any lower extremity edema or tenderness.     We were asked to admit for work up and evaluation of the above problems. Past Medical History:   Diagnosis Date    Arthritis     HANDS    Breast cancer ([facility] 2013    left with lumpectomy    Chronic sinusitis     Compression fracture     T7, mild on CXR    COPD [facility]ch[facility] obstructive [facility] disease[facility] ([facility])     mild on CXR    Depression     Diverticulosis     ETOH abuse     Fracture 6/6/14    LEFT 4TH TOE    Herpes simplex type 2 infection     Hyperlipidemia     Hypertension     Menopause     Psychiatric disorder     ANXIETY, DEPRESSION    Radiation therapy complication 8151    12 treatments        Past Surgical History:   Procedure Laterality Date    HX BREAST BIOPSY Right     2 STBB benign    HX BREAST LUMPECTOMY Left 6/17/14    malignant    HX COLONOSCOPY  2010    polyps, q4y    HX COLONOSCOPY  2015    polyps x4, 3 adenomatous, 1 hyperplastic    HX GYN  1983 (AFTER HAVING LOST A CHILD)    REVERSAL TUBAL LIGATION    HX TUBAL LIGATION  1980       Social History     Tobacco Use    Smoking status: Current Every Day Smoker     Packs/day: 1.00     Years: 45.00     Pack years: 45.00    Smokeless tobacco: Never Used    Tobacco comment: 112/20/18 patient smoking 405 cigarettes daily, she is trying to quit. Substance Use Topics    Alcohol use: No     Comment: Sober X 18 mo's (10/6/16)        Family History   Problem Relation Age of Onset    Heart Disease Father     Cancer Sister         lung    Anesth Problems Neg Hx      No Known Allergies     Prior to Admission medications    Medication Sig Start Date End Date Taking? Authorizing Provider   albuterol (Ventolin HFA) 90 mcg/actuation inhaler INHALE 2 PUFFS FOUR TIMES DAILY AS NEEDED 4/23/21   Emely Wills MD   citalopram (CeleXA) 10 mg tablet Take 1 Tab by mouth daily. 11/23/20   Sade Wills MD   traZODone (DESYREL) 50 mg tablet Take 1 Tab by mouth nightly.  11/23/20   mEely Wills MD   fluticasone propionate (FLOVENT HFA) 110 mcg/actuation inhaler Take 2 Puffs by inhalation every twelve (12) hours. 11/23/20   Jessica Wills MD   omega 3-DHA-EPA-fish oil 1,000 mg (120 mg-180 mg) capsule Take 1 Cap by mouth daily. Provider, Historical   vitamin e 600 unit capsule Take  by mouth daily. Provider, Historical   Cholecalciferol, Vitamin D3, (VITAMIN D3) 1,000 unit cap Take 1 Each by mouth daily. 6/16/15   Jessica Wills MD   acetaminophen (TYLENOL) 500 mg tablet Take 500 mg by mouth every six (6) hours as needed for Pain. Provider, Historical       REVIEW OF SYSTEMS:         Total of 12 systems reviewed as follows:       POSITIVE= underlined text  Negative = text not underlined  General:  fever, chills, sweats, generalized weakness, weight loss/gain,      loss of appetite   Eyes:    blurred vision, eye pain, loss of vision, double vision  ENT:    rhinorrhea, pharyngitis   Respiratory:   cough, sputum production, SOB, [place], wheezing, pleuritic pain   Cardiology:   chest pain, palpitations, orthopnea, PND, edema, syncope   Gastrointestinal:  abdominal pain , N/V, diarrhea, dysphagia, constipation, bleeding   Genitourinary:  frequency, urgency, dysuria, hematuria, incontinence   Muskuloskeletal :  arthralgia, myalgia, back pain  Hematology:  easy bruising, nose or gum bleeding, lymphadenopathy   Dermatological: rash, ulceration, pruritis, color change / jaundice  Endocrine:   hot flashes or polydipsia   Neurological:  headache, dizziness, confusion, focal weakness, paresthesia,     Speech difficulties, memory loss, gait difficulty  Psychological: Feelings of anxiety, depression, agitation    Objective:   VITALS:    Visit Vitals  BP (!) 177/93 (BP 1 Location: Left upper arm, BP Patient Position: At rest)   Pulse 94   Temp 98.8 °F (37.1 °C)   Resp 18   SpO2 93%       PHYSICAL EXAM:    General:    Alert, cooperative, no distress, appears stated age.      HEENT: Atraumatic, anicteric sclerae, pink conjunctivae     No oral ulcers, mucosa moist, throat clear, dentition fair  Neck:  Supple, symmetrical,  thyroid: non tender  Lungs:   B/l minimal wheezing  Chest wall:  No tenderness  No Accessory muscle use. Heart:   Regular  rhythm,  No  murmur   No edema  Abdomen:   Soft, non-tender. Not distended. Bowel sounds normal  Extremities: No cyanosis. No clubbing,      Skin turgor normal, Capillary refill normal, Radial dial pulse 2+  Skin:     Not pale. Not Jaundiced  No rashes   Psych:  Good insight. Not depressed. Not anxious or agitated. Neurologic: EOMs intact. No facial asymmetry. No aphasia or slurred speech. Symmetrical strength, Sensation grossly intact. Alert and oriented X 4.     _______________________________________________________________________  Care Plan discussed with:    Comments   Patient y    Family      RN y    Care Manager                    Consultant:      _______________________________________________________________________  Expected  Disposition:   Home with Family y   HH/PT/OT/RN    SNF/LTC    ALEXUS    ________________________________________________________________________  TOTAL TIME: 28 Minutes    Critical Care Provided     Minutes non procedure based      Comments     Reviewed previous records   >50% of visit spent in counseling and coordination of care  Discussion with patient and/or family and questions answered       ________________________________________________________________________  Signed: Minerva Soto MD    Procedures: see electronic medical records for all procedures/Xrays and details which were not copied into this note but were reviewed prior to creation of Plan.     LAB DATA REVIEWED:    Recent Results (from the past 24 hour(s))   CBC WITH AUTOMATED DIFF    Collection Time: 04/29/21 11:57 AM   Result Value Ref Range    WBC 6.8 3.6 - 11.0 K/uL    RBC 4.18 3.80 - 5.20 M/uL    HGB 14.9 11.5 - 16.0 g/dL    HCT 43.3 35.0 - 47.0 %    .6 (H) 80.0 - 99.0 FL    MCH 35.6 (H) 26.0 - 34.0 PG    MCHC 34.4 30.0 - 36.5 g/dL    RDW 11.9 11.5 - 14.5 %    PLATELET 185 120 - 479 K/uL    MPV 8.7 (L) 8.9 - 12.9 FL    NRBC 0.0 0  WBC    ABSOLUTE NRBC 0.00 0.00 - 0.01 K/uL    NEUTROPHILS 73 32 - 75 %    LYMPHOCYTES 20 12 - 49 %    MONOCYTES 6 5 - 13 %    EOSINOPHILS 0 0 - 7 %    BASOPHILS 0 0 - 1 %    IMMATURE GRANULOCYTES 1 (H) 0.0 - 0.5 %    ABS. NEUTROPHILS 4.9 1.8 - 8.0 K/UL    ABS. LYMPHOCYTES 1.4 0.8 - 3.5 K/UL    ABS. MONOCYTES 0.4 0.0 - 1.0 K/UL    ABS. EOSINOPHILS 0.0 0.0 - 0.4 K/UL    ABS. BASOPHILS 0.0 0.0 - 0.1 K/UL    ABS. IMM. GRANS. 0.0 0.00 - 0.04 K/UL    DF AUTOMATED     METABOLIC PANEL, COMPREHENSIVE    Collection Time: 04/29/21 11:57 AM   Result Value Ref Range    Sodium 138 136 - 145 mmol/L    Potassium 3.9 3.5 - 5.1 mmol/L    Chloride 100 97 - 108 mmol/L    CO2 26 21 - 32 mmol/L    Anion gap 12 5 - 15 mmol/L    Glucose 84 65 - 100 mg/dL    BUN 12 6 - 20 MG/DL    Creatinine 0.62 0.55 - 1.02 MG/DL    BUN/Creatinine ratio 19 12 - 20      GFR est AA >60 >60 ml/min/1.73m2    GFR est non-AA >60 >60 ml/min/1.73m2    Calcium 8.7 8.5 - 10.1 MG/DL    Bilirubin, total 0.5 0.2 - 1.0 MG/DL    ALT (SGPT) 22 12 - 78 U/L    AST (SGOT) 20 15 - 37 U/L    Alk.  phosphatase 71 45 - 117 U/L    Protein, total 6.7 6.4 - 8.2 g/dL    Albumin 3.3 (L) 3.5 - 5.0 g/dL    Globulin 3.4 2.0 - 4.0 g/dL    A-G Ratio 1.0 (L) 1.1 - 2.2     LIPASE    Collection Time: 04/29/21 11:57 AM   Result Value Ref Range    Lipase 72 (L) 73 - 393 U/L   LACTIC ACID    Collection Time: 04/29/21 11:57 AM   Result Value Ref Range    Lactic acid 3.8 (HH) 0.4 - 2.0 MMOL/L   TROPONIN I    Collection Time: 04/29/21 11:57 AM   Result Value Ref Range    Troponin-I, Qt. <0.05 <0.05 ng/mL   ETHYL ALCOHOL    Collection Time: 04/29/21 11:57 AM   Result Value Ref Range    ALCOHOL(ETHYL),SERUM 129 (H) <10 MG/DL   SAMPLES BEING HELD    Collection Time: 04/29/21 11:57 AM   Result Value Ref Range    SAMPLES BEING HELD 1SST     COMMENT        Add-on orders for these samples will be processed based on acceptable specimen integrity and analyte stability, which may vary by analyte. OCCULT BLOOD, STOOL    Collection Time: 04/29/21 12:15 PM   Result Value Ref Range    Occult blood, stool Positive (A) NEG     URINALYSIS W/MICROSCOPIC    Collection Time: 04/29/21 12:50 PM   Result Value Ref Range    Color YELLOW/STRAW      Appearance CLEAR CLEAR      Specific gravity 1.008 1.003 - 1.030      pH (UA) 6.0 5.0 - 8.0      Protein Negative NEG mg/dL    Glucose Negative NEG mg/dL    Ketone Negative NEG mg/dL    Bilirubin Negative NEG      Blood SMALL (A) NEG      Urobilinogen 0.2 0.2 - 1.0 EU/dL    Nitrites Negative NEG      Leukocyte Esterase Negative NEG      WBC 0-4 0 - 4 /hpf    RBC 0-5 0 - 5 /hpf    Epithelial cells FEW FEW /lpf    Bacteria Negative NEG /hpf   LACTIC ACID    Collection Time: 04/29/21  1:59 PM   Result Value Ref Range    Lactic acid 2.8 (HH) 0.4 - 2.0 MMOL/L   PROTHROMBIN TIME + INR    Collection Time: 04/29/21  2:56 PM   Result Value Ref Range    INR 1.0 0.9 - 1.1      Prothrombin time 10.1 9.0 - 11.1 sec   PTT    Collection Time: 04/29/21  2:56 PM   Result Value Ref Range    aPTT 23.9 22.1 - 31.0 sec    aPTT, therapeutic range     58.0 - 77.0 SECS   CBC WITH AUTOMATED DIFF    Collection Time: 04/29/21  6:41 PM   Result Value Ref Range    WBC 6.5 3.6 - 11.0 K/uL    RBC 3.88 3.80 - 5.20 M/uL    HGB 13.9 11.5 - 16.0 g/dL    HCT 40.3 35.0 - 47.0 %    .9 (H) 80.0 - 99.0 FL    MCH 35.8 (H) 26.0 - 34.0 PG    MCHC 34.5 30.0 - 36.5 g/dL    RDW 12.0 11.5 - 14.5 %    PLATELET 943 559 - 876 K/uL    MPV 8.7 (L) 8.9 - 12.9 FL    NRBC 0.0 0  WBC    ABSOLUTE NRBC 0.00 0.00 - 0.01 K/uL    NEUTROPHILS 67 32 - 75 %    LYMPHOCYTES 22 12 - 49 %    MONOCYTES 9 5 - 13 %    EOSINOPHILS 1 0 - 7 %    BASOPHILS 0 0 - 1 %    IMMATURE GRANULOCYTES 1 (H) 0.0 - 0.5 %    ABS. NEUTROPHILS 4.4 1.8 - 8.0 K/UL    ABS. LYMPHOCYTES 1.4 0.8 - 3.5 K/UL    ABS. MONOCYTES 0.6 0.0 - 1.0 K/UL    ABS. EOSINOPHILS 0.0 0.0 - 0.4 K/UL    ABS. BASOPHILS 0.0 0.0 - 0.1 K/UL    ABS. IMM. GRANS. 0.0 0.00 - 0.04 K/UL    DF AUTOMATED     METABOLIC PANEL, COMPREHENSIVE    Collection Time: 04/29/21  8:15 PM   Result Value Ref Range    Sodium 136 136 - 145 mmol/L    Potassium 4.1 3.5 - 5.1 mmol/L    Chloride 103 97 - 108 mmol/L    CO2 25 21 - 32 mmol/L    Anion gap 8 5 - 15 mmol/L    Glucose 77 65 - 100 mg/dL    BUN 10 6 - 20 MG/DL    Creatinine 0.34 (L) 0.55 - 1.02 MG/DL    BUN/Creatinine ratio 29 (H) 12 - 20      GFR est AA >60 >60 ml/min/1.73m2    GFR est non-AA >60 >60 ml/min/1.73m2    Calcium 8.4 (L) 8.5 - 10.1 MG/DL    Bilirubin, total 0.8 0.2 - 1.0 MG/DL    ALT (SGPT) 19 12 - 78 U/L    AST (SGOT) 15 15 - 37 U/L    Alk.  phosphatase 65 45 - 117 U/L    Protein, total 6.1 (L) 6.4 - 8.2 g/dL    Albumin 3.0 (L) 3.5 - 5.0 g/dL    Globulin 3.1 2.0 - 4.0 g/dL    A-G Ratio 1.0 (L) 1.1 - 2.2     LACTIC ACID    Collection Time: 04/29/21  8:15 PM   Result Value Ref Range    Lactic acid 0.8 0.4 - 2.0 MMOL/L

## 2021-04-30 NOTE — PROGRESS NOTES
End of Shift Note    Bedside shift change report given to Ion Pinto RN (oncoming nurse) by Janay Law (offgoing nurse). Report included the following information SBAR, Kardex, Intake/Output, MAR and Recent Results    Shift worked:  7am-7pm     Shift summary and any significant changes:     Pt transferred to unit today. No complaints of pain. No other significant changes. Concerns for physician to address:  None. Zone phone for oncoming shift:   [phone]       Activity:  Activity Level: Up ad mesfin  Number times ambulated in hallways past shift: 0  Number of times OOB to chair past shift: 0    Cardiac:   Cardiac Monitoring: Yes      Cardiac Rhythm: Normal sinus rhythm    Access:   Current line(s): PIV     Genitourinary:   Urinary status: voiding    Respiratory:   O2 Device: None (Room air)  Chronic home O2 use?: NO  Incentive spirometer at bedside: N/A     GI:  Last Bowel Movement Date: 04/30/21  Current diet:  DIET NPO  Passing flatus: YES  Tolerating current diet: YES       Pain Management:   Patient states pain is manageable on current regimen: YES    Skin:  Parish Score: 23  Interventions: increase time out of bed    Patient Safety:  Fall Score:  Total Score: 0  Interventions: gripper socks       Length of Stay:  Expected LOS: 3d 0h  Actual LOS: [address]

## 2021-05-01 VITALS
SYSTOLIC BLOOD PRESSURE: 163 MMHG | TEMPERATURE: 97.8 F | RESPIRATION RATE: 18 BRPM | HEART RATE: 90 BPM | OXYGEN SATURATION: 93 % | DIASTOLIC BLOOD PRESSURE: 78 MMHG

## 2021-05-01 LAB
HCT VFR BLD AUTO: 41.4 % (ref 35–47)
HGB BLD-MCNC: 14.7 G/DL (ref 11.5–16)

## 2021-05-01 PROCEDURE — 74011250637 HC RX REV CODE- 250/637: Performed by: STUDENT IN AN ORGANIZED HEALTH CARE EDUCATION/TRAINING PROGRAM

## 2021-05-01 PROCEDURE — 74011250637 HC RX REV CODE- 250/637: Performed by: NURSE PRACTITIONER

## 2021-05-01 PROCEDURE — 36415 COLL VENOUS BLD VENIPUNCTURE: CPT

## 2021-05-01 PROCEDURE — 85018 HEMOGLOBIN: CPT

## 2021-05-01 PROCEDURE — 74011250636 HC RX REV CODE- 250/636: Performed by: NURSE PRACTITIONER

## 2021-05-01 PROCEDURE — 74011250636 HC RX REV CODE- 250/636: Performed by: STUDENT IN AN ORGANIZED HEALTH CARE EDUCATION/TRAINING PROGRAM

## 2021-05-01 RX ORDER — AMLODIPINE BESYLATE 5 MG/1
5 TABLET ORAL DAILY
Qty: 60 TAB | Refills: 1 | Status: SHIPPED | OUTPATIENT
Start: 2021-05-01 | End: 2022-05-26

## 2021-05-01 RX ORDER — IPRATROPIUM BROMIDE AND ALBUTEROL SULFATE 2.5; .5 MG/3ML; MG/3ML
3 SOLUTION RESPIRATORY (INHALATION)
Status: DISCONTINUED | OUTPATIENT
Start: 2021-05-01 | End: 2021-05-01 | Stop reason: HOSPADM

## 2021-05-01 RX ADMIN — Medication 10 ML: at 05:34

## 2021-05-01 RX ADMIN — HYDRALAZINE HYDROCHLORIDE 10 MG: 20 INJECTION INTRAMUSCULAR; INTRAVENOUS at 00:03

## 2021-05-01 RX ADMIN — CITALOPRAM HYDROBROMIDE 10 MG: 20 TABLET ORAL at 09:37

## 2021-05-01 RX ADMIN — HYDRALAZINE HYDROCHLORIDE 10 MG: 20 INJECTION INTRAMUSCULAR; INTRAVENOUS at 04:10

## 2021-05-01 RX ADMIN — SODIUM CHLORIDE 75 ML/HR: 9 INJECTION, SOLUTION INTRAVENOUS at 00:02

## 2021-05-01 NOTE — PROGRESS NOTES
Reason for Admission:  Lower GI Bleed                   RUR Score: 12%                    Plan for utilizing home health: Unknown at this time, currently there are no PT/OT consults. The patient reports that she is independent in her ADLs and IADLs         PCP: First and Last name:  Onel Martin MD   Name of Practice:    Are you a current patient: Yes/No: Yes   Approximate date of last visit: About 8 months ago    Can you participate in a virtual visit with your PCP: Yes                    Current Advanced Directive/Advance Care Plan: Full Code    Healthcare Decision Maker: Bharti Hicks, Sister, Phone: [phone]            Secondary Decision Maker: Ena Mckeon - Sister - [phone]                  Transition of Care Plan:                    CM met with the patient at bedside to discuss dispo plan. The patient resides by herself in a 1 level home with a basement with 3 steps to enter. She is  and she has 1 daughter and an 5 y/o grandson that live in [place], [place]. Her sister resides in [place]. Her family is very supportive. The patient is completely independent in her ADLs and IADLs and she uses no assistive device when ambulating. She is able to drive and her daughter will be transporting her home. She uses [facility] for short-term medications and she uses a mail-order pharmacy for her long-term medications. She has no history of using [facility] services or SNF/IPR placement in the past. CM will continue to assist with dispo planning as needed. Care Management Interventions  PCP Verified by CM: Yes(The patient saw her PCP about 8-9 months ago )  Mode of Transport at Discharge:  Other (see comment)(The patient's daughter is transporting her home )  Transition of Care Consult (CM Consult): Discharge Planning  MyChart Signup: No  Discharge Durable Medical Equipment: No  Physical Therapy Consult: No  Occupational Therapy Consult: No  Speech Therapy Consult: No  Current Support Network: Lives Alone  Confirm Follow Up Transport: Self  The Patient and/or Patient Representative was Provided with a Choice of Provider and Agrees with the Discharge Plan?: Yes  Name of the Patient Representative Who was Provided with a Choice of Provider and Agrees with the Discharge Plan: [address] [facility] [address] Provided?: No  Discharge Location  Discharge Placement: Home    [place], [place]

## 2021-05-01 NOTE — DISCHARGE SUMMARY
Hospitalist Discharge Summary     Patient ID:  Leidy Kline  [id number]  72 y.o.  1955 4/29/2021    PCP on record: Refugio Plascencia MD    Admit date: 4/29/2021  Discharge date and time: 5/1/2021    DISCHARGE DIAGNOSIS:  Lower GI bleeding likely d/t diverticulosis  HTN  ETOH abuse    CONSULTATIONS:  IP CONSULT TO HOSPITALIST  IP CONSULT TO GASTROENTEROLOGY    Excerpted HPI from H&P of Theodore Gonzalez MD:  Jessiac Hodge is a 72 y.o. female with past medical history of EtOH abuse, COPD, depression presented with bright red bleeding per rectum. She went to [facility] ED with bright red bleeding per rectum and was transferred here. She woke up this morning at 3 AM and had bright red bleeding per rectum. She also had another episode in outside hospital.  She denies any belly pain, fever, chest pain. She mentions that she always has cough and has chronic shortness of breath on exertion. She denies any lower extremity edema or tenderness. ______________________________________________________________________  DISCHARGE SUMMARY/HOSPITAL COURSE:  for full details see H&P, daily progress notes, labs, consult notes. Patient admitted for Lower Gi bleeding likely d/t Diverticulosis. Doesn't have Bright red bleeding anymore, had a BM this morning with dark brown stool this morning. Hb remains stable. Will discharge today with f/u with Gastroenterologist for outpatient colonoscopy. Started on Amlodipine for HTN .        _______________________________________________________________________  Patient seen and examined by me on discharge day. Pertinent Findings:  Gen:    Not in distress  Chest: Clear lungs  CVS:   Regular rhythm.   No edema  Abd:  Soft, not distended, not tender  Neuro:  Alert,   _______________________________________________________________________  DISCHARGE MEDICATIONS:   Current Discharge Medication List      START taking these medications    Details amLODIPine (NORVASC) 5 mg tablet Take 1 Tab by mouth daily. Qty: 60 Tab, Refills: 1         CONTINUE these medications which have NOT CHANGED    Details   albuterol (Ventolin HFA) 90 mcg/actuation inhaler INHALE 2 PUFFS FOUR TIMES DAILY AS NEEDED  Qty: 54 g, Refills: 1    Associated Diagnoses: Chronic bronchitis, unspecified chronic bronchitis type (HCC)      citalopram (CeleXA) 10 mg tablet Take 1 Tab by mouth daily. Qty: 90 Tab, Refills: 1    Associated Diagnoses: Anxiety      traZODone (DESYREL) 50 mg tablet Take 1 Tab by mouth nightly. Qty: 90 Tab, Refills: 1    Associated Diagnoses: Insomnia, unspecified type      fluticasone propionate (FLOVENT HFA) 110 mcg/actuation inhaler Take 2 Puffs by inhalation every twelve (12) hours. Qty: 3 Inhaler, Refills: 1    Associated Diagnoses: Chronic bronchitis, unspecified chronic bronchitis type ([address]      omega 3-DHA-EPA-fish oil 1,000 mg (120 mg-180 mg) capsule Take 1 Cap by mouth daily. vitamin e 600 unit capsule Take  by mouth daily. Cholecalciferol, Vitamin D3, (VITAMIN D3) 1,000 unit cap Take 1 Each by mouth daily. Qty: 30 Cap, Refills: 0    Associated Diagnoses: Postmenopausal      acetaminophen (TYLENOL) 500 mg tablet Take 500 mg by mouth every six (6) hours as needed for Pain. Patient Follow Up Instructions:    Activity: Activity as tolerated  Diet: Cardiac Diet  Wound Care: None needed    Follow-up with Gastroenterologist    Follow-up Information     Follow up With Specialties Details Why Contact Info    Carina Wade, [address] [facility]  [phone]      Abdiaziz Clemons MD Gastroenterology   [address] [place] [address]  [phone]          ________________________________________________________________    Risk of deterioration: Moderate    Condition at Discharge: Stable  __________________________________________________________________    Disposition  Home with family, no needs    ____________________________________________________________________    Code Status: Full Code  ___________________________________________________________________      Total time in minutes spent coordinating this discharge (includes going over instructions, follow-up, prescriptions, and preparing report for sign off to her PCP) :  >30 minutes    Signed:  Minerva Soto MD .

## 2021-05-01 NOTE — DISCHARGE INSTRUCTIONS
l                 HOSPITALIST DISCHARGE INSTRUCTIONS    NAME: David Artis   :  1955   MRN:  [id number]     Date/Time:  2021 8:37 AM    ADMIT DATE: 2021     DISCHARGE DATE: 2021     DISCHARGE DIAGNOSIS:  Active Problems:    Lower GI bleeding (2021)     Likely d/t diverticulosis    Please follow up with Gastroenterologist for colonoscopy  Complete abstinence from drinking and smoking. Follow-up Information     Follow up With Specialties Details Why Contact Info    Felisa Skiff, MD Family Medicine   [address] [place] [address],[facility] Floor  [phone]      Rosie Jackson MD Gastroenterology   [address] [place] [address]  [phone]            MEDICATIONS:  As per medication reconciliation  list  · It is important that you take the medication exactly as they are prescribed. · Keep your medication in the bottles provided by the pharmacist and keep a list of the medication names, dosages, and times to be taken in your wallet. · Do not take other medications without consulting your doctor. Pain Management: per above medications    What to do at Home    Recommended diet:  Regular Diet    Recommended activity: Activity as tolerated    If you have questions regarding the hospital related prescriptions or hospital related issues please call [facility] andrzej Jackson at [phone]. If you experience any of the following symptoms then please call your primary care physician or return to the emergency room if you cannot get hold of your doctor:  Fever, chills, nausea, vomiting, diarrhea, change in mentation, falling, bleeding, shortness of breath    Follow Up:  Dr. Felisa Skiff, MD  you are to call and set up an appointment to see them in 7-10 days. Information obtained by :  I understand that if any problems occur once I am at home I am to contact my physician.     I understand and acknowledge receipt of the instructions indicated above.                                                                                                                                            Physician's or R.N.'s Signature                                                                  Date/Time                                                                                                                                              Patient or Representative Signature                                                          Date/Time

## 2021-05-01 NOTE — PROGRESS NOTES
DISCHARGE NOTE FROM [facility] NURSE    Patient determined to be stable for discharge by attending provider. I have reviewed the discharge instructions with the patient. They verbalized understanding and all questions were answered to their satisfaction. No complaints or further questions were expressed. Medications sent to [facility] pharmacy. Appropriate educational materials and medication side effect teaching were provided. PIV and cardiac monitoring were removed prior to discharge. Patient did not discharge with any line, myles, or drain.      The following personal items collected during admission were returned to the patient prior to discharge: vision glasses and clothing    Post-op patient: Eliana Avendano RN

## 2021-05-01 NOTE — PROGRESS NOTES
Medicare pt has received, reviewed, and signed 2nd IM letter informing them of their right to appeal the discharge. Signed copied has been placed on pt bedside chart.     Fernando Arshad 742, [address] [facility]

## 2021-05-01 NOTE — PROGRESS NOTES
Primary care for this patient was provided by Breanna Blanco RN and supervised by eleonora Pavon RN. I agree with all assessment data charted and all nursing interventions conducted. I was present for all interventions and reassessments, appropriate nursing care provided.         Eleonora Pavon RN

## 2021-05-01 NOTE — PROGRESS NOTES
ADULT PROTOCOL: JET AEROSOL  REASSESSMENT    Patient  Riaz Artis     72 y.o.   female     5/1/2021  8:47 AM    Breath Sounds Pre Procedure: Right Breath Sounds: Clear                               Left Breath Sounds: Clear    Breath Sounds Post Procedure: Right Breath Sounds: Clear                                 Left Breath Sounds: Clear    Breathing pattern: Pre procedure Breathing Pattern: Regular          Post procedure Breathing Pattern: Regular    Heart Rate: Pre procedure Pulse: 91           Post procedure      Resp Rate: Pre procedure Respirations: 16           Post procedure Respirations: 16                   Post procedure      Oxygen: O2 Device: None (Room air)       Changed: no    SpO2: Pre procedure SpO2: 92 %                 Post procedure SpO2: 93 %      Nebulizer Therapy: Current medications Aerosolized Medications: DuoNeb      Changed: yes PRM      Problem List:   Patient Active Problem List   Diagnosis Code    [facility] C50.912    Hypertension I10    Depression F32.9    Hyperlipidemia E78.5    COPD (chronic obstructive pulmonary disease) (HCC) J44.9    Lower GI bleeding K92.2       Respiratory Therapist: Harpreet Lott RT

## 2021-05-03 ENCOUNTER — PATIENT OUTREACH (OUTPATIENT)
Dept: CASE MANAGEMENT | Age: 66
End: 2021-05-03

## 2021-05-03 NOTE — PROGRESS NOTES
Patient contacted regarding recent discharge and COVID-19 risk. Discussed COVID-19 related testing which was not done at this time. Test results were not done. Patient informed of results, if available? na    Outreach made within 2 business days of discharge: Yes    Care Transition Nurse/ Ambulatory Care Manager/ LPN Care Coordinator contacted the patient by telephone to perform post discharge assessment. Verified name and  with patient as identifiers. Patient has following risk factors of: COPD. CTN/ACM/LPN reviewed discharge instructions, medical action plan and red flags related to discharge diagnosis. Reviewed and educated them on any new and changed medications related to discharge diagnosis. Advised obtaining a 90-day supply of all daily and as-needed medications. Advance Care Planning:   Does patient have an Advance Directive: yes; reviewed and current     Education provided regarding infection prevention, and signs and symptoms of COVID-19 and when to seek medical attention with patient who verbalized understanding. Discussed exposure protocols and quarantine from [address] you at higher risk for severe illness  and given an opportunity for questions and concerns. The patient agrees to contact the COVID-19 hotline [phone] or PCP office for questions related to their healthcare. CTN/ACM/LPN provided contact information for future reference. From CDC: Are you at higher risk for severe illness?  Wash your hands often.  Avoid close contact (6 feet, which is about two arm lengths) with people who are sick.  Put distance between yourself and other people if COVID-19 is spreading in your community.  Clean and disinfect frequently touched surfaces.  Avoid all cruise travel and non-essential air travel.  Call your healthcare professional if you have concerns about COVID-19 and your underlying condition or if you are sick.     For more information on steps you can take to protect yourself, see CDC's How to Protect Yourself      Patient is having Norvasc delivered to home. Feels tired but okay. Denies chest pain, SOB, fever, N/V/D.

## 2021-05-06 ENCOUNTER — VIRTUAL VISIT (OUTPATIENT)
Dept: FAMILY MEDICINE CLINIC | Age: 66
End: 2021-05-06
Payer: MEDICARE

## 2021-05-06 DIAGNOSIS — K57.91 GASTROINTESTINAL HEMORRHAGE ASSOCIATED WITH INTESTINAL DIVERTICULOSIS: ICD-10-CM

## 2021-05-06 DIAGNOSIS — I10 ESSENTIAL HYPERTENSION: ICD-10-CM

## 2021-05-06 DIAGNOSIS — Z09 HOSPITAL DISCHARGE FOLLOW-UP: Primary | ICD-10-CM

## 2021-05-06 DIAGNOSIS — F41.9 ANXIETY: ICD-10-CM

## 2021-05-06 DIAGNOSIS — G47.00 INSOMNIA, UNSPECIFIED TYPE: ICD-10-CM

## 2021-05-06 PROCEDURE — 99496 TRANSJ CARE MGMT HIGH F2F 7D: CPT | Performed by: FAMILY MEDICINE

## 2021-05-06 PROCEDURE — G8427 DOCREV CUR MEDS BY ELIG CLIN: HCPCS | Performed by: FAMILY MEDICINE

## 2021-05-06 RX ORDER — TRAZODONE HYDROCHLORIDE 50 MG/1
50 TABLET ORAL
Qty: 90 TAB | Refills: 1 | Status: SHIPPED | OUTPATIENT
Start: 2021-05-06 | End: 2022-05-26 | Stop reason: SDUPTHER

## 2021-05-06 NOTE — PROGRESS NOTES
Jon Temple is a 77 y.o. female who was seen by synchronous (real-time) audio-video technology on 5/6/2021 for Hospital Follow Up        Assessment & Plan:   Diagnoses and all orders for this visit:    1. Hospital discharge follow-up    2. Gastrointestinal hemorrhage associated with intestinal diverticulosis  -     REFERRAL TO GENERAL SURGERY    3. Essential hypertension    4. Insomnia, unspecified type  -     traZODone (DESYREL) 50 mg tablet; Take 1 Tab by mouth nightly. 5. Anxiety      Advised to RTC for BP check soon since Amlodipine starting soon        Subjective:     Pt recently seen in the hospital for GI bleed  Thought to be d/t Diverticulosis  Was admitted on 4/29/21  Discharged 5/1/21  Had F/U call 5/3/21    Last Hb 14.7 5/1/21, and stable  Had bleeding with diarrhea several times before calling 911  Bleeding stopped on its own in hospital  Has good BM today and no more bleeding    Also has GI F/U in [facility]  But needs to be set up locally for Colonoscopy  Was due for recheck last year    Also was started on Amlodipine for HTN at [facility]  Has not started it yet, ordered from mail in pharmacy  Ordered monitor too    On Celexa every other day and has enough  Needs refill on Trazodone and helping with sleep  No SE together    Prior to Admission medications    Medication Sig Start Date End Date Taking? Authorizing Provider   traZODone (DESYREL) 50 mg tablet Take 1 Tab by mouth nightly. 5/6/21  Yes Sade Wills MD   amLODIPine (NORVASC) 5 mg tablet Take 1 Tab by mouth daily. 5/1/21  Yes Cortes Luther MD   albuterol (Ventolin HFA) 90 mcg/actuation inhaler INHALE 2 PUFFS FOUR TIMES DAILY AS NEEDED 4/23/21  Yes Sade Wills MD   citalopram (CeleXA) 10 mg tablet Take 1 Tab by mouth daily. 11/23/20  Yes Sade Wills MD   fluticasone propionate (FLOVENT HFA) 110 mcg/actuation inhaler Take 2 Puffs by inhalation every twelve (12) hours.  11/23/20  Yes Elma Julia Bell MD   omega 3-DHA-EPA-fish oil 1,000 mg (120 mg-180 mg) capsule Take 1 Cap by mouth daily. Yes Provider, Historical   vitamin e 600 unit capsule Take  by mouth daily. Yes Provider, Historical   Cholecalciferol, Vitamin D3, (VITAMIN D3) 1,000 unit cap Take 1 Each by mouth daily. 6/16/15  Yes Julia Wills MD   acetaminophen (TYLENOL) 500 mg tablet Take 500 mg by mouth every six (6) hours as needed for Pain. Yes Provider, Historical   traZODone (DESYREL) 50 mg tablet Take 1 Tab by mouth nightly. 11/23/20 5/6/21  Julia Wills MD     Patient Active Problem List   Diagnosis Code    Breast cancer, left breast ([facility] C50.912    Hypertension I10    Depression F32.9    Hyperlipidemia E78.5    COPD [facility]ch[facility] disease) [facility] J44.9    Lower GI bleeding K92.2     Patient Active Problem List    Diagnosis Date Noted    Lower GI bleeding 04/29/2021    COPD (chronic obstructive pulmonary disease) ([facility]     Hyperlipidemia     Depression     Hypertension     Breast cancer, [facility] breast ([facility] 06/06/2014     Current Outpatient Medications   Medication Sig Dispense Refill    traZODone (DESYREL) 50 mg tablet Take 1 Tab by mouth nightly. 90 Tab 1    amLODIPine (NORVASC) 5 mg tablet Take 1 Tab by mouth daily. 60 Tab 1    albuterol (Ventolin HFA) 90 mcg/actuation inhaler INHALE 2 PUFFS FOUR TIMES DAILY AS NEEDED 54 g 1    citalopram (CeleXA) 10 mg tablet Take 1 Tab by mouth daily. 90 Tab 1    fluticasone propionate (FLOVENT HFA) 110 mcg/actuation inhaler Take 2 Puffs by inhalation every twelve (12) hours. 3 Inhaler 1    omega 3-DHA-EPA-fish oil 1,000 mg (120 mg-180 mg) capsule Take 1 Cap by mouth daily.  vitamin e 600 unit capsule Take  by mouth daily.  Cholecalciferol, Vitamin D3, (VITAMIN D3) 1,000 unit cap Take 1 Each by mouth daily. 30 Cap 0    acetaminophen (TYLENOL) 500 mg tablet Take 500 mg by mouth every six (6) hours as needed for Pain.        No Known Allergies  Past Medical History:   Diagnosis Date    Arthritis     HANDS    [facility] ([facility] 2013    left with lumpectomy    Chronic sinusitis     Compression fracture     T7, mild on CXR    COPD (chronic obstructive pulmonary disease) (HCC)     mild on CXR    Depression     Diverticulosis     ETOH abuse     Fracture 6/6/14    LEFT 4TH TOE    Herpes simplex type 2 infection     Hyperlipidemia     Hypertension     Menopause     Psychiatric disorder     ANXIETY, DEPRESSION    Radiation therapy complication 6325    12 treatments     Past Surgical History:   Procedure Laterality Date    HX BREAST BIOPSY Right     2 STBB benign    HX BREAST LUMPECTOMY Left 6/17/14    malignant    HX COLONOSCOPY  2010    polyps, q4y    HX COLONOSCOPY  2015    polyps x4, 3 adenomatous, 1 hyperplastic    HX GYN  1983 (AFTER HAVING LOST A CHILD)    REVERSAL TUBAL LIGATION    HX TUBAL LIGATION  1980     Family History   Problem Relation Age of Onset    Heart Disease Father     Cancer Sister         lung    Anesth Problems Neg Hx      Social History     Tobacco Use    Smoking status: Current Every Day Smoker     Packs/day: 1.00     Years: 45.00     Pack years: 45.00    Smokeless tobacco: Never Used    Tobacco comment: 112/20/18 patient smoking 405 cigarettes daily, she is trying to quit. Substance Use Topics    Alcohol use: No     Comment: Sober X 18 mo's (10/6/16)       Review of Systems   Constitutional: Negative for fever. HENT: Negative for congestion. Respiratory: Negative for cough. Cardiovascular: Negative for chest pain. Gastrointestinal: Negative for abdominal pain, blood in stool, diarrhea and melena. Endo/Heme/Allergies: Does not bruise/bleed easily.        Objective:     Patient-Reported Vitals 5/6/2021   Patient-Reported Weight 115lbs   Patient-Reported Height -   Patient-Reported Pulse -   Patient-Reported Temperature -   Patient-Reported Systolic  -   Patient-Reported Diastolic - [INSTRUCTIONS:  \"[x]\" Indicates a positive item  \"[]\" Indicates a negative item  -- DELETE ALL ITEMS NOT EXAMINED]    Constitutional: [x] Appears well-developed and well-nourished [x] No apparent distress      [] Abnormal -     Mental status: [x] Alert and awake  [x] Oriented to person/place/time [x] Able to follow commands    [] Abnormal -     Eyes:   EOM    [x]  Normal    [] Abnormal -   Sclera  [x]  Normal    [] Abnormal -          Discharge [x]  None visible   [] Abnormal -     HENT: [x] Normocephalic, atraumatic  [] Abnormal -   [x] Mouth/Throat: Mucous membranes are moist    External Ears [x] Normal  [] Abnormal -    Neck: [x] No visualized mass [] Abnormal -     Pulmonary/Chest: [x] Respiratory effort normal   [x] No visualized signs of difficulty breathing or respiratory distress        [] Abnormal -      Musculoskeletal:   [x] Normal gait with no signs of ataxia         [x] Normal range of motion of neck        [] Abnormal -     Neurological:        [x] No Facial Asymmetry (Cranial nerve 7 motor function) (limited exam due to video visit)          [x] No gaze palsy        [] Abnormal -          Skin:        [x] No significant exanthematous lesions or discoloration noted on facial skin         [] Abnormal -            Psychiatric:       [x] Normal Affect [] Abnormal -        [x] No Hallucinations    Other pertinent observable physical exam findings:-        We discussed the expected course, resolution and complications of the diagnosis(es) in detail. Medication risks, benefits, costs, interactions, and alternatives were discussed as indicated. I advised her to contact the office if her condition worsens, changes or fails to improve as anticipated. She expressed understanding with the diagnosis(es) and plan. Lacey Artis, was evaluated through a synchronous (real-time) audio-video encounter. The patient (or guardian if applicable) is aware that this is a billable service.  Verbal consent to proceed has been obtained within the past 12 months. The visit was conducted pursuant to the emergency declaration under the [address] [place] [address] and the [employer] and [place] [employer] Act. Patient identification was verified, and a caregiver was present when appropriate. The patient was located in a state where the provider was credentialed to provide care.       Julien Yang MD

## 2021-05-11 ENCOUNTER — TELEPHONE (OUTPATIENT)
Dept: FAMILY MEDICINE CLINIC | Age: 66
End: 2021-05-11

## 2021-05-11 NOTE — TELEPHONE ENCOUNTER
Kristina Yadav [facility] Office   Phone Number:  [phone] (Call me)             General Message/Vendor Calls     Caller's first and last name: N/A       Reason for call: Colonoscopy appointment.        Callback required yes/no and why: Yes/Confirm       Best contact number(s):[phone]       Details to clarify the request: Patient would like to talk with Suzie regarding her colonoscopy appointment.         Keagan Eid

## 2021-05-20 ENCOUNTER — OFFICE VISIT (OUTPATIENT)
Dept: SURGERY | Age: 66
End: 2021-05-20
Payer: MEDICARE

## 2021-05-20 VITALS
OXYGEN SATURATION: 93 % | HEART RATE: 113 BPM | SYSTOLIC BLOOD PRESSURE: 159 MMHG | DIASTOLIC BLOOD PRESSURE: 99 MMHG | HEIGHT: 64 IN | BODY MASS INDEX: 19.75 KG/M2 | RESPIRATION RATE: 16 BRPM | WEIGHT: 115.7 LBS

## 2021-05-20 DIAGNOSIS — Z86.010 HISTORY OF COLON POLYPS: Primary | ICD-10-CM

## 2021-05-20 PROCEDURE — 99203 OFFICE O/P NEW LOW 30 MIN: CPT | Performed by: SURGERY

## 2021-05-20 NOTE — PROGRESS NOTES
Subjective:      Stephany Meza is an 77 y.o. female who is referred by: Sam Hendricks MD for evaluation for colonoscopy. Indication: personal history of prior colon polyp(s) - last colonoscopy was 2015. Patient was recently admitted for 2 days at the beginning of the month for painless blooding. Patient was only observed, did not require a blood transfusion or intervention. Patient denies any prior bleeding episodes. She does have a history of diverticular disease thought to be the cause for the admission. Patient denies any abdominal pain, N/V, weight loss, diarrhea or constipation. She denies any pain with bowel movements. She also denies any family history of colon ca, or IBD. HPI     Patient Active Problem List   Diagnosis Code    Breast cancer, left breast ([facility] C50.912    Hypertension I10    Depression F32.9    Hyperlipidemia E78.5    COPD (chronic obstructive pulmonary disease) ([facility]) J44.9    Lower GI bleeding K92.2     Patient Active Problem List    Diagnosis Date Noted    Lower GI bleeding 04/29/2021    [facility]     Hyperlipidemia     Depression     Hypertension     Breast cancer, left breast ([facility] 06/06/2014     Current Outpatient Medications   Medication Sig Dispense Refill    traZODone (DESYREL) 50 mg tablet Take 1 Tab by mouth nightly. 90 Tab 1    amLODIPine (NORVASC) 5 mg tablet Take 1 Tab by mouth daily. 60 Tab 1    albuterol (Ventolin HFA) 90 mcg/actuation inhaler INHALE 2 PUFFS FOUR TIMES DAILY AS NEEDED 54 g 1    citalopram (CeleXA) 10 mg tablet Take 1 Tab by mouth daily. 90 Tab 1    fluticasone propionate (FLOVENT HFA) 110 mcg/actuation inhaler Take 2 Puffs by inhalation every twelve (12) hours. 3 Inhaler 1    omega 3-DHA-EPA-fish oil 1,000 mg (120 mg-180 mg) capsule Take 1 Cap by mouth daily.  vitamin e 600 unit capsule Take  by mouth daily.       Cholecalciferol, Vitamin D3, (VITAMIN D3) 1,000 unit cap Take 1 Each by mouth daily. 30 Cap 0    acetaminophen (TYLENOL) 500 mg tablet Take 500 mg by mouth every six (6) hours as needed for Pain. No Known Allergies  Past Medical History:   Diagnosis Date    Arthritis     HANDS    Breast cancer ([address] 2013    left with lumpectomy    Chronic sinusitis     Compression fracture     T7, mild on CXR    COPD (chronic obstructive pulmonary disease) (HCC)     mild on CXR    Depression     Diverticulosis     ETOH abuse     Fracture 6/6/14    LEFT 4TH TOE    Herpes simplex type 2 infection     Hyperlipidemia     Hypertension     Menopause     Psychiatric disorder     ANXIETY, DEPRESSION    Radiation therapy complication 5245    12 treatments     Past Surgical History:   Procedure Laterality Date    HX BREAST BIOPSY Right     2 STBB benign    HX BREAST LUMPECTOMY Left 6/17/14    malignant    HX COLONOSCOPY  2010    polyps, q4y    HX COLONOSCOPY  2015    polyps x4, 3 adenomatous, 1 hyperplastic    HX GYN  1983 (AFTER HAVING LOST A CHILD)    REVERSAL TUBAL LIGATION    HX TUBAL LIGATION  1980     Family History   Problem Relation Age of Onset    Heart Disease Father     Cancer Sister         lung    Anesth Problems Neg Hx      Social History     Tobacco Use    Smoking status: Current Every Day Smoker     Packs/day: 1.00     Years: 45.00     Pack years: 45.00    Smokeless tobacco: Never Used    Tobacco comment: 112/20/18 patient smoking 405 cigarettes daily, she is trying to quit. Substance Use Topics    Alcohol use: No     Comment: Sober X 18 mo's (10/6/16)        Review of Systems  Review of Systems   Constitutional: Negative for chills, fever and weight loss. HENT: Negative for sore throat. Respiratory: Negative for cough and wheezing. Cardiovascular: Negative for chest pain, palpitations and leg swelling. Gastrointestinal: Positive for blood in stool. Negative for abdominal pain, constipation, diarrhea, nausea and vomiting. Musculoskeletal: Negative for back pain and neck pain. Skin: Negative for itching and rash. Neurological: Negative for tingling, tremors, focal weakness and headaches. Objective:     Visit Vitals  BP (!) 159/99 (BP 1 Location: Left upper arm, BP Patient Position: Sitting)   Pulse (!) 113   Resp 16   Ht 5' 4\" (1.626 m)   Wt 115 lb 11.2 oz (52.5 kg)   SpO2 93%   BMI 19.86 kg/m²     Physical Exam  Constitutional:       General: She is not in acute distress. Appearance: Normal appearance. She is normal weight. She is not ill-appearing. HENT:      Head: Normocephalic and atraumatic. Nose: Nose normal. No congestion. Mouth/Throat:      Mouth: Mucous membranes are moist.      Pharynx: Oropharynx is clear. Eyes:      General: No scleral icterus. Cardiovascular:      Rate and Rhythm: Normal rate and regular rhythm. Heart sounds: No murmur heard. Pulmonary:      Effort: Pulmonary effort is normal. No respiratory distress. Breath sounds: Normal breath sounds. Abdominal:      General: Abdomen is flat. Bowel sounds are normal. There is no distension. Palpations: Abdomen is soft. There is no mass. Tenderness: There is no abdominal tenderness. Musculoskeletal:         General: No swelling. Normal range of motion. Cervical back: Normal range of motion and neck supple. Lymphadenopathy:      Cervical: No cervical adenopathy. Skin:     General: Skin is warm. Coloration: Skin is not jaundiced. Neurological:      General: No focal deficit present. Mental Status: She is alert and oriented to person, place, and time. Physical Exam  Constitutional:       General: She is not in acute distress. Appearance: Normal appearance. She is normal weight. She is not ill-appearing. HENT:      Head: Normocephalic and atraumatic. Nose: Nose normal. No congestion. Mouth/Throat:      Mouth: Mucous membranes are moist.      Pharynx: Oropharynx is clear. Eyes:      General: No scleral icterus. Cardiovascular:      Rate and Rhythm: Normal rate and regular rhythm. Heart sounds: No murmur heard. Pulmonary:      Effort: Pulmonary effort is normal. No respiratory distress. Breath sounds: Normal breath sounds. Abdominal:      General: Abdomen is flat. Bowel sounds are normal. There is no distension. Palpations: Abdomen is soft. There is no mass. Tenderness: There is no abdominal tenderness. Musculoskeletal:         General: No swelling. Normal range of motion. Cervical back: Normal range of motion and neck supple. Lymphadenopathy:      Cervical: No cervical adenopathy. Skin:     General: Skin is warm. Coloration: Skin is not jaundiced. Neurological:      General: No focal deficit present. Mental Status: She is alert and oriented to person, place, and time. Assessment/Plan:     77year old with history of prior colon polyps removed in 2015 and recent admission for lower GI bleed. The risks (bleeding, perforation, spleen injury, etc.) and benefits of my recommendations, as well as other treatment options (biopsy/polypectomy) were discussed with the patient today. Questions were answered. The patient was counseled at length about the risks of cynthia Covid-19 during their perioperative period and any recovery window from their procedure. The patient was made aware that cynthia Covid-19  may worsen their prognosis for recovering from their procedure and lend to a higher morbidity and/or mortality risk. All material risks, benefits, and reasonable alternatives including postponing the procedure were discussed. The patient does wish to proceed with the procedure at this time. Patient will be given a hand out to follow for bowel prep.

## 2021-05-20 NOTE — PROGRESS NOTES
1. Have you been to the ER, urgent care clinic since your last visit? Hospitalized since your last visit? new pt    2. Have you seen or consulted any other health care providers outside of the [address] [place] [address] since your last visit? Include any pap smears or colon screening.  new pt

## 2021-05-26 ENCOUNTER — ANESTHESIA EVENT (OUTPATIENT)
Dept: SURGERY | Age: 66
End: 2021-05-26
Payer: MEDICARE

## 2021-05-26 NOTE — ANESTHESIA PREPROCEDURE EVALUATION
Relevant Problems   RESPIRATORY SYSTEM   (+) COPD (chronic obstructive pulmonary disease) (HCC)      NEUROLOGY   (+) Depression      CARDIOVASCULAR   (+) Hypertension      PERSONAL HX & FAMILY HX OF CANCER   (+) Breast cancer, left breast (HCC)       Anesthetic History   No history of anesthetic complications            Review of Systems / Medical History  Patient summary reviewed, nursing notes reviewed and pertinent labs reviewed    Pulmonary    COPD      Smoker (current, 39 pk yrs)         Neuro/Psych         Psychiatric history (depression, anxiety)     Cardiovascular    Hypertension          Hyperlipidemia         GI/Hepatic/Renal  Within defined limits              Endo/Other        Cancer (breast)     Other Findings              Physical Exam    Airway  Mallampati: II  TM Distance: 4 - 6 cm  Neck ROM: normal range of motion        Cardiovascular    Rhythm: regular  Rate: normal         Dental         Pulmonary  Breath sounds clear to auscultation               Abdominal         Other Findings            Anesthetic Plan    ASA: 3  Anesthesia type: MAC          Induction: Intravenous  Anesthetic plan and risks discussed with: Patient

## 2021-06-04 ENCOUNTER — HOSPITAL ENCOUNTER (OUTPATIENT)
Dept: PREADMISSION TESTING | Age: 66
Discharge: HOME | End: 2021-06-04

## 2021-06-04 NOTE — PERIOP NOTES
[address] [place] [address]  SURGICAL PRE-ADMISSION INSTRUCTIONS    ARRIVAL  · You will be called the day before your surgery with your expected arrival time. · Sign in at the  of the hospital.  You will be directed to the Surgical Waiting Room. · Please arrive at your scheduled appointment time. You have been scheduled to arrive for your procedure one or two hours prior to the expected start time of your procedure. · Every effort will be made to minimize your wait but please be aware that unforeseen circumstances may affect our schedule. EATING  · DO NOT EAT OR DRINK ANYTHING AFTER MIDNIGHT ON THE EVENING BEFORE YOUR SURGERY OR ON THE DAY OF YOUR SURGERY except for your medications (as instructed) with a sip of water. · Do not use gum, mints or lozenges on the morning of your surgery. · Please do not smoke or chew tobacco before your surgery. MEDICATIONS   · none    STOP THESE MEDICATIONS AT THE TIMES LISTED BELOW  none     DRIVING/TRANSPORATION  · Have a responsible adult to drive you home from the hospital and to stay with you over night. Please have them plan to remain in the hospital during your surgery. Your surgery will not be done if you do not have a responsible adult to take you home and to stay with you. · If you have arranged for public transport, you must have a responsible adult to ride with you (who is not the ). · You may not drive for 24 hours after anesthesia. PREPARATION  · If you have a Living WiIl/Advance Directive, please bring a copy with you to scan into your chart. · Please DO NOT wear makeup or nail polish  · Please leave valuables at home,  DO NOT wear jewelry. · Wear loose, comfortable clothing that is large enough to cover a bulky dressing. SPECIAL INSTRUCTIONS:  · Follow your surgeon's instructions for preoperative bowel prep.     Reviewed above preoperative instructions and answered questions by phone interview    Patient:  Terri Farley ENMA Artis   Date:     June 4, 2021  Time:   10:47 AM    RN:  Shoshana Hayes RN    Date:     June 4, 2021  Time:   10:47 AM

## 2021-06-11 ENCOUNTER — ANESTHESIA (OUTPATIENT)
Dept: SURGERY | Age: 66
End: 2021-06-11
Payer: MEDICARE

## 2021-06-11 ENCOUNTER — HOSPITAL ENCOUNTER (OUTPATIENT)
Age: 66
Setting detail: OUTPATIENT SURGERY
Discharge: HOME | End: 2021-06-11
Attending: SURGERY | Admitting: SURGERY
Payer: MEDICARE

## 2021-06-11 VITALS
HEART RATE: 89 BPM | TEMPERATURE: 97 F | RESPIRATION RATE: 16 BRPM | DIASTOLIC BLOOD PRESSURE: 84 MMHG | SYSTOLIC BLOOD PRESSURE: 138 MMHG | OXYGEN SATURATION: 92 %

## 2021-06-11 PROCEDURE — 2709999900 HC NON-CHARGEABLE SUPPLY: Performed by: SURGERY

## 2021-06-11 PROCEDURE — 76060000032 HC ANESTHESIA 0.5 TO 1 HR: Performed by: SURGERY

## 2021-06-11 PROCEDURE — 74011250636 HC RX REV CODE- 250/636: Performed by: ANESTHESIOLOGY

## 2021-06-11 PROCEDURE — 74011250636 HC RX REV CODE- 250/636: Performed by: SURGERY

## 2021-06-11 PROCEDURE — 76010000138 HC OR TIME 0.5 TO 1 HR: Performed by: SURGERY

## 2021-06-11 PROCEDURE — 76210000006 HC OR PH I REC 0.5 TO 1 HR: Performed by: SURGERY

## 2021-06-11 RX ORDER — SODIUM CHLORIDE, SODIUM LACTATE, POTASSIUM CHLORIDE, CALCIUM CHLORIDE 600; 310; 30; 20 MG/100ML; MG/100ML; MG/100ML; MG/100ML
150 INJECTION, SOLUTION INTRAVENOUS CONTINUOUS
Status: DISCONTINUED | OUTPATIENT
Start: 2021-06-11 | End: 2021-06-11 | Stop reason: HOSPADM

## 2021-06-11 RX ORDER — SODIUM CHLORIDE 0.9 % (FLUSH) 0.9 %
5-40 SYRINGE (ML) INJECTION EVERY 8 HOURS
Status: DISCONTINUED | OUTPATIENT
Start: 2021-06-11 | End: 2021-06-11 | Stop reason: HOSPADM

## 2021-06-11 RX ORDER — MIDAZOLAM HYDROCHLORIDE 1 MG/ML
INJECTION, SOLUTION INTRAMUSCULAR; INTRAVENOUS AS NEEDED
Status: DISCONTINUED | OUTPATIENT
Start: 2021-06-11 | End: 2021-06-11 | Stop reason: HOSPADM

## 2021-06-11 RX ORDER — PROPOFOL 10 MG/ML
INJECTION, EMULSION INTRAVENOUS AS NEEDED
Status: DISCONTINUED | OUTPATIENT
Start: 2021-06-11 | End: 2021-06-11 | Stop reason: HOSPADM

## 2021-06-11 RX ORDER — SODIUM CHLORIDE, SODIUM LACTATE, POTASSIUM CHLORIDE, CALCIUM CHLORIDE 600; 310; 30; 20 MG/100ML; MG/100ML; MG/100ML; MG/100ML
75 INJECTION, SOLUTION INTRAVENOUS CONTINUOUS
Status: CANCELLED | OUTPATIENT
Start: 2021-06-11

## 2021-06-11 RX ORDER — SODIUM CHLORIDE 0.9 % (FLUSH) 0.9 %
5-40 SYRINGE (ML) INJECTION AS NEEDED
Status: CANCELLED | OUTPATIENT
Start: 2021-06-11

## 2021-06-11 RX ORDER — SODIUM CHLORIDE 0.9 % (FLUSH) 0.9 %
5-40 SYRINGE (ML) INJECTION EVERY 8 HOURS
Status: CANCELLED | OUTPATIENT
Start: 2021-06-11

## 2021-06-11 RX ORDER — SODIUM CHLORIDE 0.9 % (FLUSH) 0.9 %
5-40 SYRINGE (ML) INJECTION AS NEEDED
Status: DISCONTINUED | OUTPATIENT
Start: 2021-06-11 | End: 2021-06-11 | Stop reason: HOSPADM

## 2021-06-11 RX ADMIN — PROPOFOL 50 MG: 10 INJECTION, EMULSION INTRAVENOUS at 10:46

## 2021-06-11 RX ADMIN — PROPOFOL 50 MG: 10 INJECTION, EMULSION INTRAVENOUS at 10:36

## 2021-06-11 RX ADMIN — PROPOFOL 50 MG: 10 INJECTION, EMULSION INTRAVENOUS at 10:32

## 2021-06-11 RX ADMIN — PROPOFOL 50 MG: 10 INJECTION, EMULSION INTRAVENOUS at 10:55

## 2021-06-11 RX ADMIN — SODIUM CHLORIDE, POTASSIUM CHLORIDE, SODIUM LACTATE AND CALCIUM CHLORIDE 150 ML/HR: 600; 310; 30; 20 INJECTION, SOLUTION INTRAVENOUS at 08:26

## 2021-06-11 RX ADMIN — PROPOFOL 100 MG: 10 INJECTION, EMULSION INTRAVENOUS at 10:24

## 2021-06-11 RX ADMIN — PROPOFOL 50 MG: 10 INJECTION, EMULSION INTRAVENOUS at 10:39

## 2021-06-11 RX ADMIN — MIDAZOLAM 2 MG: 1 INJECTION INTRAMUSCULAR; INTRAVENOUS at 10:20

## 2021-06-11 NOTE — DISCHARGE INSTRUCTIONS
ENDOSCOPY DISCHARGE INSTRUCTIONS    ACTIVITY  · Do not drive, do not operate heavy machinery or equipment, do not make legal decisions, and do not drink alcohol for 24 hours. · Have a responsible adult to care for you overnight. You can start getting back to your routine as soon as you feel able. DIET  · A light diet is recommended for today. Avoid greasy or spicy foods. Resume your normal diet tomorrow. .  · If you had an esophageal dilation, you may have soft foods only today. MEDICATIONS  · Resume current medications (see Medication Reconciliation form)      CALL YOUR DOCTOR  If you have any abnormal symptoms after the procedure, including these listed below. If you are unable to reach you doctor, you may go to the hospital's Emergency department. EGD - Upper Endoscopy  · You may burp more air than usual today. · You may have a slight sore throat for a few days, if so gargle with warm salt water  · Call the doctor - If you develop a severe sore throat, chest pain, fever, persistent vomiting, black tarry stools, or IV site becomes red, swollen, or tender. FOLLOW-UP   · See your doctor in the office as scheduled   · Biopsy results will be released with you at that time. SPECIAL INSTRUCTIONS:      I acknowledge that I have received an fully understand the above instructions eduard my questions have been answered to my satisfaction.          Patient: Steve Bowen  Date:     June 11, 2021 Time:   11:33 AM       Max Chatterjee RN  Date:     June 11, 2021 Time:   11:33 AM

## 2021-06-11 NOTE — PROGRESS NOTES
Brief Postoperative Note    Patient: Leidy Kline  YOB: 1955  MRN: [id number]    Date of Procedure: 6/11/2021     Pre-Op Diagnosis: HISTORY OF POLYPS    Post-Op Diagnosis:  Diverticulosis, with severe angulation at the sigmoid colon    Procedure(s):  COLONOSCOPY    Surgeon(s):  Barbi Mera MD    Surgical Assistant: None    Anesthesia: MAC     Estimated Blood Loss (mL): none    Complications: none    Specimens: none    Implants: none    Drains: none    Findings: diverticulosis, with a very severe angulation at the sigmoid colon with significant resistance to advancing scope    Electronically Signed by Alicia Sutherland MD on 6/11/2021 at 11:18 AM

## 2021-06-11 NOTE — H&P
Subjective:      Chasity Munoz is an 77 y.o. female who is referred by: Salvador Jeff MD for evaluation for colonoscopy. Indication: personal history of prior colon polyp(s) - last colonoscopy was 2015. Patient was recently admitted for 2 days at the beginning of the month for painless blooding. Patient was only observed, did not require a blood transfusion or intervention. Patient denies any prior bleeding episodes. She does have a history of diverticular disease thought to be the cause for the admission. Patient denies any abdominal pain, N/V, weight loss, diarrhea or constipation. She denies any pain with bowel movements. She also denies any family history of colon ca, or IBD.       HPI     Patient Active Problem List   Diagnosis Code    Breast cancer, left breast ([facility] C50.912    Hypertension I10    Depression F32.9    Hyperlipidemia E78.5    COPD (chronic obstructive pulmonary disease) (HCC) J44.9    Lower GI bleeding K92.2     Patient Active Problem List    Diagnosis Date Noted    Lower GI bleeding 04/29/2021    COPD (chronic obstructive pulmonary disease) ([facility]     Hyperlipidemia     Depression     Hypertension     Breast cancer, left breast ([facility] 06/06/2014     Current Facility-Administered Medications   Medication Dose Route Frequency Provider Last Rate Last Admin    lactated Ringers infusion  150 mL/hr IntraVENous CONTINUOUS Delia Carr  mL/hr at 06/11/21 0826 150 mL/hr at 06/11/21 0826     No Known Allergies  Past Medical History:   Diagnosis Date    Arthritis     HANDS    Breast cancer ([facility] 2013    left with lumpectomy    Chronic sinusitis     Compression fracture     T7, mild on CXR    COPD (chronic obstructive pulmonary disease) (HCC)     mild on CXR    Depression     Diverticulosis     ETOH abuse     Fracture 6/6/14    LEFT 4TH TOE    Herpes simplex type 2 infection     Hyperlipidemia     Hypertension     Menopause     Psychiatric disorder ANXIETY, DEPRESSION    Radiation therapy complication 2100    12 treatments     Past Surgical History:   Procedure Laterality Date    HX BREAST BIOPSY Right     2 STBB benign    HX BREAST LUMPECTOMY Left 6/17/14    malignant    HX COLONOSCOPY  2010    polyps, q4y    HX COLONOSCOPY  2015    polyps x4, 3 adenomatous, 1 hyperplastic    HX GYN  1983 (AFTER HAVING LOST A CHILD)    REVERSAL TUBAL LIGATION    HX TUBAL LIGATION  1980     Family History   Problem Relation Age of Onset    Heart Disease Father     Cancer Sister         lung    Anesth Problems Neg Hx      Social History     Tobacco Use    Smoking status: Current Every Day Smoker     Packs/day: 1.00     Years: 45.00     Pack years: 45.00    Smokeless tobacco: Never Used    Tobacco comment: 112/20/18 patient smoking 405 cigarettes daily, she is trying to quit. Substance Use Topics    Alcohol use: No     Comment: Sober X 18 mo's (10/6/16)        Review of Systems  Review of Systems   Constitutional: Negative for chills, fever and weight loss. HENT: Negative for sore throat. Respiratory: Negative for cough and wheezing. Cardiovascular: Negative for chest pain, palpitations and leg swelling. Gastrointestinal: Positive for blood in stool. Negative for abdominal pain, constipation, diarrhea, nausea and vomiting. Musculoskeletal: Negative for back pain and neck pain. Skin: Negative for itching and rash. Neurological: Negative for tingling, tremors, focal weakness and headaches. Objective:     Visit Vitals  BP (!) 165/75   Pulse 93   Temp 98.1 °F (36.7 °C)   Resp 16   SpO2 93%     Physical Exam  Constitutional:       General: She is not in acute distress. Appearance: Normal appearance. She is normal weight. She is not ill-appearing. HENT:      Head: Normocephalic and atraumatic. Nose: Nose normal. No congestion. Mouth/Throat:      Mouth: Mucous membranes are moist.      Pharynx: Oropharynx is clear.    Eyes: General: No scleral icterus. Cardiovascular:      Rate and Rhythm: Normal rate and regular rhythm. Heart sounds: No murmur heard. Pulmonary:      Effort: Pulmonary effort is normal. No respiratory distress. Breath sounds: Normal breath sounds. Abdominal:      General: Abdomen is flat. Bowel sounds are normal. There is no distension. Palpations: Abdomen is soft. There is no mass. Tenderness: There is no abdominal tenderness. Musculoskeletal:         General: No swelling. Normal range of motion. Cervical back: Normal range of motion and neck supple. Lymphadenopathy:      Cervical: No cervical adenopathy. Skin:     General: Skin is warm. Coloration: Skin is not jaundiced. Neurological:      General: No focal deficit present. Mental Status: She is alert and oriented to person, place, and time. Physical Exam  Constitutional:       General: She is not in acute distress. Appearance: Normal appearance. She is normal weight. She is not ill-appearing. HENT:      Head: Normocephalic and atraumatic. Nose: Nose normal. No congestion. Mouth/Throat:      Mouth: Mucous membranes are moist.      Pharynx: Oropharynx is clear. Eyes:      General: No scleral icterus. Cardiovascular:      Rate and Rhythm: Normal rate and regular rhythm. Heart sounds: No murmur heard. Pulmonary:      Effort: Pulmonary effort is normal. No respiratory distress. Breath sounds: Normal breath sounds. Abdominal:      General: Abdomen is flat. Bowel sounds are normal. There is no distension. Palpations: Abdomen is soft. There is no mass. Tenderness: There is no abdominal tenderness. Musculoskeletal:         General: No swelling. Normal range of motion. Cervical back: Normal range of motion and neck supple. Lymphadenopathy:      Cervical: No cervical adenopathy. Skin:     General: Skin is warm. Coloration: Skin is not jaundiced.    Neurological: General: No focal deficit present. Mental Status: She is alert and oriented to person, place, and time. Assessment/Plan:     77year old with history of prior colon polyps removed in 2015 and recent admission for lower GI bleed. The risks (bleeding, perforation, spleen injury, etc.) and benefits of my recommendations, as well as other treatment options (biopsy/polypectomy) were discussed with the patient today. Questions were answered. The patient was counseled at length about the risks of cynthia Covid-19 during their perioperative period and any recovery window from their procedure. The patient was made aware that cynthia Covid-19  may worsen their prognosis for recovering from their procedure and lend to a higher morbidity and/or mortality risk. All material risks, benefits, and reasonable alternatives including postponing the procedure were discussed. The patient does wish to proceed with the procedure at this time. Patient will be given a hand out to follow for bowel prep.

## 2021-06-11 NOTE — ANESTHESIA POSTPROCEDURE EVALUATION
Procedure(s):  COLONOSCOPY. MAC    Anesthesia Post Evaluation      Multimodal analgesia: multimodal analgesia used between 6 hours prior to anesthesia start to PACU discharge  Patient location during evaluation: PACU  Patient participation: complete - patient participated  Level of consciousness: awake and alert  Pain score: 0  Pain management: adequate  Airway patency: patent  Anesthetic complications: no  Cardiovascular status: stable  Respiratory status: nasal cannula  Hydration status: acceptable  Post anesthesia nausea and vomiting:  none  Final Post Anesthesia Temperature Assessment:  Normothermia (36.0-37.5 degrees C)      INITIAL Post-op Vital signs:   Vitals Value Taken Time   /80 06/11/21 1110   Temp     Pulse 94 06/11/21 1110   Resp 16 06/11/21 1110   SpO2 99 % 06/11/21 1110   Vitals shown include unvalidated device data.

## 2021-06-14 NOTE — OP NOTES
[facility]  OPERATIVE REPORT    Name:  Gustavo Montalvo  MR#:  [id number]  :  1955  ACCOUNT #:  [de-identified]  DATE OF SERVICE:  2021    PREOPERATIVE DIAGNOSES:  1. History of colonic adenomas. 2.  History of diverticulosis. 3.  History of abdominal pain. POSTOPERATIVE DIAGNOSES:  1. History of colonic adenomas. 2.  History of diverticulosis. 3.  History of abdominal pain. PROCEDURE PERFORMED:  Attempted colonoscopy. SURGEON:  Denice Tello MD    ASSISTANT:  none    ANESTHESIA:  Sedation. COMPLICATIONS:  none    SPECIMENS REMOVED: none    IMPLANTS:  none    ESTIMATED BLOOD LOSS:  none    INTRAOPERATIVE FINDINGS:  Extensive diverticulosis with severe angulation and tortuous sigmoid colon and fullness in the pelvis. PROCEDURE:  The patient was brought into the operating room. After undergoing sedation and being placed in the left lateral decubitus position, time-out was taken to identify correct patient, procedure, and diagnosis, after which, digital rectal exam was performed which was normal.  An adult colonoscope was then inserted into the rectum, and slowly advanced without any issue into the sigmoid colon. Upon entering into the sigmoid, there was extensive diverticulosis and immediate tortuosity, that required torsion of the colonoscope and abdominal pressure to attempt to maneuver through the sigmoid colon. There was also fullness in the pelvis not allowing the colon wall to fulling expand. After attempting these maneuvers for a long period of time, the sigmoid colon was finally bypassed however there was increased tension within the scope. There was a fear that the tension could potentially cause injury in the sigmoid colon once the scope was advanced past the transverse colon. At this point, a decision was made to stop the procedure and obtain more abdominal films to better identify the issue in the pelvis.   The bowel prep appeared adequate for the area inspected by the scope. The colonoscope was then completely withdrawn and the patient was transferred to the PACU in good condition.         Cammie Driver MD      [email or web address]  D:  06/13/2021 15:06  T:  06/13/2021 22:48  JOB #:  [id number]

## 2021-06-29 ENCOUNTER — OFFICE VISIT (OUTPATIENT)
Dept: SURGERY | Age: 66
End: 2021-06-29
Payer: MEDICARE

## 2021-06-29 VITALS
BODY MASS INDEX: 19.75 KG/M2 | HEIGHT: 64 IN | HEART RATE: 95 BPM | DIASTOLIC BLOOD PRESSURE: 72 MMHG | SYSTOLIC BLOOD PRESSURE: 108 MMHG | WEIGHT: 115.7 LBS

## 2021-06-29 DIAGNOSIS — Z86.010 HISTORY OF COLON POLYPS: Primary | ICD-10-CM

## 2021-06-29 PROCEDURE — 99024 POSTOP FOLLOW-UP VISIT: CPT | Performed by: SURGERY

## 2021-06-29 NOTE — PROGRESS NOTES
S. Mrs Meghan Jordan is a 77year old s/p colonoscopy in 2015 which showed adenomatous polyp and diverticulosis. Patient subsequently was hospitalized in 04/2021 for a lower GI bleed secondary to the diverticulosis. She underwent repeat colonoscopy earlier this month however, it was not completed secondary to severe tortuosity and tension in the sigmoid colon secondary to severe diverticular disease and redundancy. Patient has been doing well without any current complaints. O. avss     Abd: benign, soft , non-distended     A/P 77year old with history of adenomatous polyps/diverticulosis requiring surveillance study    -I presented options for next step of surveillance, CT colonography/ barium enema/ or referral to another physician to attempt colonoscopy. Patient prefers a barium enema study. Will order study and arrange a follow up appointment.      -Also discussed issues with a tortuous/redundant colon pre-disposing to chronic constipation.   Patient is currently taking metamucil, and hydrating appropriately

## 2021-06-29 NOTE — PROGRESS NOTES
1. Have you been to the ER, urgent care clinic since your last visit? Hospitalized since your last visit? No    2. Have you seen or consulted any other health care providers outside of the [address] [place] [address] since your last visit? Include any pap smears or colon screening.  No

## 2021-07-21 ENCOUNTER — HOSPITAL ENCOUNTER (OUTPATIENT)
Dept: GENERAL RADIOLOGY | Age: 66
Discharge: HOME | End: 2021-07-21
Attending: SURGERY
Payer: MEDICARE

## 2021-07-21 ENCOUNTER — HOSPITAL ENCOUNTER (OUTPATIENT)
Dept: MAMMOGRAPHY | Age: 66
Discharge: HOME | End: 2021-07-21
Attending: FAMILY MEDICINE
Payer: MEDICARE

## 2021-07-21 DIAGNOSIS — Z86.010 HISTORY OF COLON POLYPS: ICD-10-CM

## 2021-07-21 DIAGNOSIS — Z12.31 VISIT FOR SCREENING MAMMOGRAM: ICD-10-CM

## 2021-07-21 PROCEDURE — 77063 BREAST TOMOSYNTHESIS BI: CPT

## 2021-07-21 PROCEDURE — 74270 X-RAY XM COLON 1CNTRST STD: CPT

## 2021-07-22 ENCOUNTER — TELEPHONE (OUTPATIENT)
Dept: FAMILY MEDICINE CLINIC | Age: 66
End: 2021-07-22

## 2021-07-23 NOTE — TELEPHONE ENCOUNTER
I have called and talked to this patient. I gave her the mammogram results per Dr Samia Gutierrez review. See result note for details.

## 2021-07-23 NOTE — PROGRESS NOTES
I have called and talked to this patient and gave her this result per Dr Julius Benavidez review. Patient verbalizes understanding.

## 2021-09-20 DIAGNOSIS — J42 CHRONIC BRONCHITIS, UNSPECIFIED CHRONIC BRONCHITIS TYPE (HCC): ICD-10-CM

## 2021-09-20 RX ORDER — ALBUTEROL SULFATE 90 UG/1
AEROSOL, METERED RESPIRATORY (INHALATION)
Qty: 54 G | Refills: 1 | Status: SHIPPED | OUTPATIENT
Start: 2021-09-20 | End: 2022-02-02

## 2021-09-20 NOTE — TELEPHONE ENCOUNTER
NOTE: This is a temporary refill from Dona Zavala PA-C for Dr. Kelly Watson while she is out of town. Please direct all future refills to Dr. Kelly Watson.

## 2022-03-19 PROBLEM — K92.2 LOWER GI BLEEDING: Status: ACTIVE | Noted: 2021-04-29

## 2022-04-19 DIAGNOSIS — J42 CHRONIC BRONCHITIS, UNSPECIFIED CHRONIC BRONCHITIS TYPE (HCC): ICD-10-CM

## 2022-04-19 RX ORDER — ALBUTEROL SULFATE 90 UG/1
AEROSOL, METERED RESPIRATORY (INHALATION)
Qty: 18 G | Refills: 0 | Status: SHIPPED | OUTPATIENT
Start: 2022-04-19 | End: 2022-05-26 | Stop reason: SDUPTHER

## 2022-05-26 ENCOUNTER — OFFICE VISIT (OUTPATIENT)
Dept: FAMILY MEDICINE CLINIC | Age: 67
End: 2022-05-26
Payer: MEDICARE

## 2022-05-26 VITALS
HEIGHT: 62 IN | SYSTOLIC BLOOD PRESSURE: 135 MMHG | TEMPERATURE: 97 F | RESPIRATION RATE: 14 BRPM | WEIGHT: 102 LBS | DIASTOLIC BLOOD PRESSURE: 90 MMHG | HEART RATE: 100 BPM | OXYGEN SATURATION: 97 % | BODY MASS INDEX: 18.77 KG/M2

## 2022-05-26 DIAGNOSIS — J42 CHRONIC BRONCHITIS, UNSPECIFIED CHRONIC BRONCHITIS TYPE (HCC): ICD-10-CM

## 2022-05-26 DIAGNOSIS — R91.1 LUNG NODULE: ICD-10-CM

## 2022-05-26 DIAGNOSIS — Z87.891 PERSONAL HISTORY OF NICOTINE DEPENDENCE: ICD-10-CM

## 2022-05-26 DIAGNOSIS — Z72.0 TOBACCO ABUSE: ICD-10-CM

## 2022-05-26 DIAGNOSIS — G47.00 INSOMNIA, UNSPECIFIED TYPE: ICD-10-CM

## 2022-05-26 DIAGNOSIS — Z00.00 MEDICARE ANNUAL WELLNESS VISIT, SUBSEQUENT: Primary | ICD-10-CM

## 2022-05-26 DIAGNOSIS — K52.9 GASTROENTERITIS: ICD-10-CM

## 2022-05-26 DIAGNOSIS — C50.212 MALIGNANT NEOPLASM OF UPPER-INNER QUADRANT OF LEFT FEMALE BREAST, UNSPECIFIED ESTROGEN RECEPTOR STATUS (HCC): ICD-10-CM

## 2022-05-26 LAB
EXP DATE SOLUTION: NORMAL
EXP DATE SWAB: NORMAL
LOT NUMBER SOLUTION: NORMAL
LOT NUMBER SWAB: NORMAL
SARS-COV-2 RNA POC: NEGATIVE

## 2022-05-26 PROCEDURE — G0439 PPPS, SUBSEQ VISIT: HCPCS | Performed by: FAMILY MEDICINE

## 2022-05-26 PROCEDURE — 87635 SARS-COV-2 COVID-19 AMP PRB: CPT | Performed by: FAMILY MEDICINE

## 2022-05-26 PROCEDURE — 99214 OFFICE O/P EST MOD 30 MIN: CPT | Performed by: FAMILY MEDICINE

## 2022-05-26 RX ORDER — CIPROFLOXACIN 500 MG/1
500 TABLET ORAL 2 TIMES DAILY
Qty: 20 TABLET | Refills: 0 | Status: SHIPPED | OUTPATIENT
Start: 2022-05-26 | End: 2022-06-05

## 2022-05-26 RX ORDER — TRAZODONE HYDROCHLORIDE 50 MG/1
50 TABLET ORAL
Qty: 90 TABLET | Refills: 1 | Status: SHIPPED | OUTPATIENT
Start: 2022-05-26

## 2022-05-26 RX ORDER — ALBUTEROL SULFATE 90 UG/1
AEROSOL, METERED RESPIRATORY (INHALATION)
Qty: 3 EACH | Refills: 1 | Status: SHIPPED | OUTPATIENT
Start: 2022-05-26 | End: 2022-06-09 | Stop reason: SDUPTHER

## 2022-05-26 RX ORDER — ONDANSETRON 4 MG/1
4 TABLET, ORALLY DISINTEGRATING ORAL
Qty: 10 TABLET | Refills: 0 | Status: SHIPPED | OUTPATIENT
Start: 2022-05-26 | End: 2022-11-03

## 2022-05-26 RX ORDER — PREDNISONE 10 MG/1
TABLET ORAL
Qty: 21 TABLET | Refills: 0 | Status: SHIPPED | OUTPATIENT
Start: 2022-05-26 | End: 2022-06-09 | Stop reason: ALTCHOICE

## 2022-05-26 NOTE — PROGRESS NOTES
This is the Subsequent Medicare Annual Wellness Exam, performed 12 months or more after the Initial AWV or the last Subsequent AWV    I have reviewed the patient's medical history in detail and updated the computerized patient record. Assessment/Plan   Education and counseling provided:  Are appropriate based on today's review and evaluation    1. Medicare annual wellness visit, subsequent       Depression Risk Factor Screening       Functional Ability:   Does the patient exhibit a steady gait? yes   How long did it take the patient to get up and walk from a sitting position? 2   Is the patient self reliant?  (ie can do own laundry, meals, household chores)  yes     Does the patient handle his/her own medications? yes     Does the patient handle his/her own money? yes     Is the patients home safe (ie good lighting, handrails on stairs and bath, etc.)? yes     Did you notice or did patient express any hearing difficulties? no     Did you notice or did patient express any vision difficulties?   no     Were distance and reading eye charts used? no       Advance Care Planning:   Patient was offered the opportunity to discuss advance care planning:  yes     Does patient have an Advance Directive:  no   If no, did you provide information on Caring Connections?   yes     ADL Assessment 5/26/2022   Feeding yourself No Help Needed   Getting from bed to chair No Help Needed   Getting dressed No Help Needed   Bathing or showering No Help Needed   Walk across the room (includes cane/walker) No Help Needed   Using the telphone No Help Needed   Taking your medications No Help Needed   Preparing meals No Help Needed   Managing money (expenses/bills) No Help Needed   Moderately strenuous housework (laundry) No Help Needed   Shopping for personal items (toiletries/medicines) No Help Needed   Shopping for groceries No Help Needed   Driving No Help Needed   Climbing a flight of stairs No Help Needed   Getting to places beyond walking distances No Help Needed       Abuse Screening Questionnaire 5/26/2022   Do you ever feel afraid of your partner? N   Are you in a relationship with someone who physically or mentally threatens you? N   Is it safe for you to go home? Y       3 most recent PHQ Screens 5/26/2022   PHQ Not Done -   Little interest or pleasure in doing things Not at all   Feeling down, depressed, irritable, or hopeless Not at all   Total Score PHQ 2 0   Trouble falling or staying asleep, or sleeping too much -   Feeling tired or having little energy -   Poor appetite, weight loss, or overeating -   Feeling bad about yourself - or that you are a failure or have let yourself or your family down -   Trouble concentrating on things such as school, work, reading, or watching TV -   Moving or speaking so slowly that other people could have noticed; or the opposite being so fidgety that others notice -   Thoughts of being better off dead, or hurting yourself in some way -   PHQ 9 Score -   How difficult have these problems made it for you to do your work, take care of your home and get along with others -       Alcohol & Drug Abuse Risk Screen    Do you average more than 1 drink per night or more than 7 drinks a week:  No    On any one occasion in the past three months have you have had more than 3 drinks containing alcohol:  No          Functional Ability and Level of Safety    Hearing: Hearing is good. Activities of Daily Living: The home contains: no safety equipment. Patient does total self care      Ambulation: with no difficulty     Fall Risk:  Fall Risk Assessment, last 12 mths 5/26/2022   Able to walk? Yes   Fall in past 12 months?  0      Abuse Screen:  Patient is not abused       Cognitive Screening    Has your family/caregiver stated any concerns about your memory: no     Cognitive Screening: na    Health Maintenance Due     Health Maintenance Due   Topic Date Due    Pneumococcal 65+ years (1 - PCV) Never done  Depression Monitoring  Never done    Low dose CT lung screening  Never done    Shingrix Vaccine Age 50> (2 of 2) 12/12/2019    COVID-19 Vaccine (3 - Booster for Moderna series) 09/20/2021       Patient Care Team   Patient Care Team:  Stewart Pope MD as PCP - General (Family Medicine)  Stewart Pope MD as PCP - [facility]  Nigel Harrell MD (Obstetrics & Gynecology)  Nigel Harrell MD (Obstetrics & Gynecology)  Eliazar Montenegro MD as Physician (Hematology and Oncology)  Cassandra Pimentel MD (Surgery Physician)  Radha Somers MD (Surgery General)    History     Patient Active Problem List   Diagnosis Code    Breast cancer, left breast ([facility] C50.912    Hypertension I10    Depression F32. A    Hyperlipidemia E78.5    COPD (chronic obstructive pulmonary disease) (HCC) J44.9    Lower GI bleeding K92.2     Past Medical History:   Diagnosis Date    Arthritis     HANDS    Breast cancer ([facility] 2013    left with lumpectomy    Chronic sinusitis     Compression fracture     T7, mild on CXR    COPD (chronic obstructive pulmonary disease) (HCC)     mild on CXR    Depression     Diverticulosis     ETOH abuse     Fracture 6/6/14    LEFT 4TH TOE    Herpes simplex type 2 infection     Hyperlipidemia     Hypertension     Menopause     Psychiatric disorder     ANXIETY, DEPRESSION    Radiation therapy complication 3495    12 treatments      Past Surgical History:   Procedure Laterality Date    COLONOSCOPY N/A 6/11/2021    COLONOSCOPY performed by Steffanie Jurado MD at [facility] 1827 HX BREAST BIOPSY Right     2 STBB benign    HX BREAST LUMPECTOMY Left 6/17/14    malignant    HX COLONOSCOPY  2010    polyps, q4y    HX COLONOSCOPY  2015    polyps x4, 3 adenomatous, 1 hyperplastic    HX COLONOSCOPY  2021    HX GYN  1983 (AFTER HAVING LOST A CHILD)    REVERSAL TUBAL LIGATION    HX TUBAL LIGATION  1980     Current Outpatient Medications   Medication Sig Dispense Refill    albuterol (Ventolin HFA) 90 mcg/actuation inhaler INHALE 2 PUFFS FOUR TIMES DAILY AS NEEDED 18 g 0    citalopram (CELEXA) 10 mg tablet Take one every other day 45 Tablet 1    traZODone (DESYREL) 50 mg tablet Take 1 Tab by mouth nightly. 90 Tab 1    vitamin e 600 unit capsule Take  by mouth daily.  Cholecalciferol, Vitamin D3, (VITAMIN D3) 1,000 unit cap Take 1 Each by mouth daily. 30 Cap 0    acetaminophen (TYLENOL) 500 mg tablet Take 500 mg by mouth every six (6) hours as needed for Pain.  amLODIPine (NORVASC) 5 mg tablet Take 1 Tab by mouth daily. (Patient not taking: Reported on 5/26/2022) 60 Tab 1     No Known Allergies    Family History   Problem Relation Age of Onset    Heart Disease Father     Cancer Sister         lung    Anesth Problems Neg Hx      Social History     Tobacco Use    Smoking status: Current Every Day Smoker     Packs/day: 1.00     Years: 45.00     Pack years: 45.00    Smokeless tobacco: Never Used    Tobacco comment: 112/20/18 patient smoking 405 cigarettes daily, she is trying to quit.    Substance Use Topics    Alcohol use: No     Comment: Sober X 18 mo's (10/6/16)         Nikki Parosns LPN

## 2022-05-26 NOTE — PROGRESS NOTES
Emmanuel Thurman is a 79 y.o. female who presents to the office today with the following:  Chief Complaint   Patient presents with    Medication Refill    Annual Wellness Visit         Vomiting     nausea, burning in chestwith vomiting       HPI  HM reviewed    HTN  Not taking Amlodipine now  Stopped it   BP at home 125/70    Pt has nausea  Vomiting 2-3x a day  Started 4 d ago  No sick contact    HX of Breast Cancer  8 y ago  Has Mammogram yearly  Not seeing Onc    Depression  On Celexa 10 mg taking it every day lately    Still on Trazodone and needs refills too      Review of Systems   Constitutional: Negative for fever. HENT: Positive for congestion. Negative for sore throat. Respiratory: Positive for cough, shortness of breath (sometimes) and wheezing. Negative for sputum production. Gastrointestinal: Positive for constipation (sometimes), nausea and vomiting. Negative for abdominal pain, blood in stool, diarrhea and melena. Genitourinary: Negative. Negative for dysuria, frequency and urgency. Musculoskeletal: Negative for myalgias. See HPI.     Past Medical History:   Diagnosis Date    Arthritis     HANDS    Breast cancer ([facility] 2013    left with lumpectomy    Chronic sinusitis     Compression fracture     T7, mild on CXR    COPD (chronic obstructive pulmonary disease) (HCC)     mild on CXR    Depression     Diverticulosis     ETOH abuse     Fracture 6/6/14    LEFT 4TH TOE    Herpes simplex type 2 infection     Hyperlipidemia     Hypertension     Menopause     Psychiatric disorder     ANXIETY, DEPRESSION    Radiation therapy complication 7064    12 treatments       Past Surgical History:   Procedure Laterality Date    COLONOSCOPY N/A 6/11/2021    COLONOSCOPY performed by [facility] Tubbs [facility] at [facility] 1827 HX BREAST BIOPSY Right     2 STBB benign    HX BREAST LUMPECTOMY Left 6/17/14    malignant    HX COLONOSCOPY  2010    polyps, q4y    HX COLONOSCOPY  2015    polyps x4, 3 adenomatous, 1 hyperplastic    HX COLONOSCOPY  2021    HX GYN  1983 (AFTER HAVING LOST A CHILD)    REVERSAL TUBAL LIGATION    HX TUBAL LIGATION  1980       No Known Allergies    Current Outpatient Medications   Medication Sig    albuterol (Ventolin HFA) 90 mcg/actuation inhaler INHALE 2 PUFFS FOUR TIMES DAILY AS NEEDED    traZODone (DESYREL) 50 mg tablet Take 1 Tablet by mouth nightly.  predniSONE (STERAPRED DS) 10 mg dose pack See administration instruction per 10mg dose pack    ciprofloxacin HCl (CIPRO) 500 mg tablet Take 1 Tablet by mouth two (2) times a day for 10 days.  ondansetron (ZOFRAN ODT) 4 mg disintegrating tablet Take 1 Tablet by mouth every eight (8) hours as needed for Nausea or Vomiting.  citalopram (CELEXA) 10 mg tablet Take one every other day    vitamin e 600 unit capsule Take  by mouth daily.  Cholecalciferol, Vitamin D3, (VITAMIN D3) 1,000 unit cap Take 1 Each by mouth daily.  acetaminophen (TYLENOL) 500 mg tablet Take 500 mg by mouth every six (6) hours as needed for Pain. No current facility-administered medications for this visit. Social History     Socioeconomic History    Marital status:      Spouse name: Freedom Poon Number of children: 1    Highest education level: Some college, no degree   Tobacco Use    Smoking status: Current Every Day Smoker     Packs/day: 1.00     Years: 45.00     Pack years: 45.00    Smokeless tobacco: Never Used    Tobacco comment: 112/20/18 patient smoking 405 cigarettes daily, she is trying to quit.    Vaping Use    Vaping Use: Never used   Substance and Sexual Activity    Alcohol use: No     Comment: Sober X 18 mo's (10/6/16)    Drug use: No     Comment: MARIJUANA IN DISTANT PAST    Sexual activity: Never   Other Topics Concern     Service No    Blood Transfusions No    Caffeine Concern No    Occupational Exposure No    Sleep Concern Yes    Exercise No    Seat Belt Yes    Self-Exams Yes   Social History Narrative    Lives in [place] with her . 2 cats and a dog. Spiritual.  Finds hope in her 4 yo grandson that lives in [place]. Family History   Problem Relation Age of Onset    Heart Disease Father     Cancer Sister         lung    Anesth Problems Neg Hx          Physical Exam:  Visit Vitals  BP (!) 135/90   Pulse 100   Temp 97 °F (36.1 °C)   Resp 14   Ht 5' 2\" (1.575 m)   Wt 102 lb (46.3 kg)   SpO2 97%   BMI 18.66 kg/m²     Physical Exam  Vitals and nursing note reviewed. Constitutional:       Appearance: She is normal weight. HENT:      Head: Normocephalic and atraumatic. Right Ear: Tympanic membrane, ear canal and external ear normal.      Left Ear: Tympanic membrane, ear canal and external ear normal.   Eyes:      Extraocular Movements: Extraocular movements intact. Conjunctiva/sclera: Conjunctivae normal.   Cardiovascular:      Rate and Rhythm: Normal rate and regular rhythm. Heart sounds: Normal heart sounds. Pulmonary:      Breath sounds: Wheezing and rhonchi present. Abdominal:      General: There is no distension. Palpations: Abdomen is soft. Tenderness: There is no abdominal tenderness. There is no right CVA tenderness, left CVA tenderness or guarding. Musculoskeletal:      Right lower leg: No edema. Left lower leg: No edema. Lymphadenopathy:      Cervical: No cervical adenopathy. Skin:     General: Skin is warm and dry. Neurological:      Mental Status: She is alert and oriented to person, place, and time. Psychiatric:         Mood and Affect: Mood normal.         Behavior: Behavior normal.         Recent Results (from the past 12 hour(s))   AMB POC COVID-19 COV    Collection Time: 05/26/22 11:45 AM   Result Value Ref Range    SARS-COV-2 RNA POC Negative Negative    LOT NUMBER SWAB [id number]     EXP DATE SWAB 11.11.22     LOT NUMBER SOLUTION [id number]     EXP DATE SOLUTION 11.1.22        Assessment/Plan:    ICD-10-CM ICD-9-CM    1. Medicare annual wellness visit, subsequent  Z00.00 V70.0    2. Tobacco abuse  Z72.0 305.1 CT LOW DOSE LUNG CANCER SCREENING   3. Personal history of nicotine dependence   Z87.891 V15.82 CT LOW DOSE LUNG CANCER SCREENING   4. Chronic bronchitis, unspecified chronic bronchitis type (HCC)  J42 491.9 AMB POC COVID-19 COV      albuterol (Ventolin HFA) 90 mcg/actuation inhaler      predniSONE (STERAPRED DS) 10 mg dose pack      ciprofloxacin HCl (CIPRO) 500 mg tablet   5. Gastroenteritis  K52.9 558.9 AMB POC COVID-19 COV      CBC WITH AUTOMATED DIFF      METABOLIC PANEL, COMPREHENSIVE      ciprofloxacin HCl (CIPRO) 500 mg tablet      ondansetron (ZOFRAN ODT) 4 mg disintegrating tablet   6. Insomnia, unspecified type  G47.00 780.52 traZODone (DESYREL) 50 mg tablet   7.  Malignant neoplasm of upper-inner quadrant of left female breast, unspecified estrogen receptor status ([facility]  C50.212 174.2      Pt will set up Mammogram herself     RTC if not better or worse    Katarzyna Moran MD

## 2022-05-26 NOTE — PATIENT INSTRUCTIONS
Medicare Wellness Visit, Female     The best way to live healthy is to have a lifestyle where you eat a well-balanced diet, exercise regularly, limit alcohol use, and quit all forms of tobacco/nicotine, if applicable. Regular preventive services are another way to keep healthy. Preventive services (vaccines, screening tests, monitoring & exams) can help personalize your care plan, which helps you manage your own care. Screening tests can find health problems at the earliest stages, when they are easiest to treat. [facility] follows the current, evidence-based guidelines published by the [facility] ([facility]) when recommending preventive services for our patients. Because we follow these guidelines, sometimes recommendations change over time as research supports it. (For example, mammograms used to be recommended annually. Even though Medicare will still pay for an annual mammogram, the newer guidelines recommend a mammogram every two years for women of average risk). Of course, you and your doctor may decide to screen more often for some diseases, based on your risk and your co-morbidities (chronic disease you are already diagnosed with). Preventive services for you include:  - Medicare offers their members a free annual wellness visit, which is time for you and your primary care provider to discuss and plan for your preventive service needs. Take advantage of this benefit every year!  -All adults over the age of 72 should receive the recommended pneumonia vaccines. Current USPSTF guidelines recommend a series of two vaccines for the best pneumonia protection.   -All adults should have a flu vaccine yearly and a tetanus vaccine every 10 years.   -All adults age 48 and older should receive the shingles vaccines (series of two vaccines).       -All adults age 38-68 who are overweight should have a diabetes screening test once every three years.   -All adults born between [birth year] and 1965 should be screened once for Hepatitis C.  -Other screening tests and preventive services for persons with diabetes include: an eye exam to screen for diabetic retinopathy, a kidney function test, a foot exam, and stricter control over your cholesterol.   -Cardiovascular screening for adults with routine risk involves an electrocardiogram (ECG) at intervals determined by your doctor.   -Colorectal cancer screenings should be done for adults age 54-65 with no increased risk factors for colorectal cancer. There are a number of acceptable methods of screening for this type of cancer. Each test has its own benefits and drawbacks. Discuss with your doctor what is most appropriate for you during your annual wellness visit. The different tests include: colonoscopy (considered the best screening method), a fecal occult blood test, a fecal DNA test, and sigmoidoscopy.    -A bone mass density test is recommended when a woman turns 65 to screen for osteoporosis. This test is only recommended one time, as a screening. Some providers will use this same test as a disease monitoring tool if you already have osteoporosis. -Breast cancer screenings are recommended every other year for women of normal risk, age 54-69.  -Cervical cancer screenings for women over age 72 are only recommended with certain risk factors.      Here is a list of your current Health Maintenance items (your personalized list of preventive services) with a due date:  Health Maintenance Due   Topic Date Due    Pneumococcal Vaccine (1 - PCV) Never done    Depression Monitoring  Never done    Smoker or Former Smoker - Annual Lung Cancer Screen  Never done    Shingles Vaccine (2 of 2) 12/12/2019    COVID-19 Vaccine (3 - Booster for Moderna series) 09/20/2021

## 2022-05-27 LAB
ALBUMIN SERPL-MCNC: 4 G/DL (ref 3.5–5)
ALBUMIN/GLOB SERPL: 1.2 {RATIO} (ref 1.1–2.2)
ALP SERPL-CCNC: 78 U/L (ref 45–117)
ALT SERPL-CCNC: 20 U/L (ref 12–78)
ANION GAP SERPL CALC-SCNC: 12 MMOL/L (ref 5–15)
AST SERPL-CCNC: 23 U/L (ref 15–37)
BASOPHILS # BLD: 0 K/UL (ref 0–0.1)
BASOPHILS NFR BLD: 0 % (ref 0–1)
BILIRUB SERPL-MCNC: 1.2 MG/DL (ref 0.2–1)
BUN SERPL-MCNC: 16 MG/DL (ref 6–20)
BUN/CREAT SERPL: 23 (ref 12–20)
CALCIUM SERPL-MCNC: 9.5 MG/DL (ref 8.5–10.1)
CHLORIDE SERPL-SCNC: 84 MMOL/L (ref 97–108)
CO2 SERPL-SCNC: 29 MMOL/L (ref 21–32)
CREAT SERPL-MCNC: 0.69 MG/DL (ref 0.55–1.02)
DIFFERENTIAL METHOD BLD: ABNORMAL
EOSINOPHIL # BLD: 0.1 K/UL (ref 0–0.4)
EOSINOPHIL NFR BLD: 1 % (ref 0–7)
ERYTHROCYTE [DISTWIDTH] IN BLOOD BY AUTOMATED COUNT: 13.1 % (ref 11.5–14.5)
GLOBULIN SER CALC-MCNC: 3.4 G/DL (ref 2–4)
GLUCOSE SERPL-MCNC: 96 MG/DL (ref 65–100)
HCT VFR BLD AUTO: 53.6 % (ref 35–47)
HGB BLD-MCNC: 18.6 G/DL (ref 11.5–16)
IMM GRANULOCYTES # BLD AUTO: 0 K/UL (ref 0–0.04)
IMM GRANULOCYTES NFR BLD AUTO: 0 % (ref 0–0.5)
LYMPHOCYTES # BLD: 0.8 K/UL (ref 0.8–3.5)
LYMPHOCYTES NFR BLD: 11 % (ref 12–49)
MCH RBC QN AUTO: 35.6 PG (ref 26–34)
MCHC RBC AUTO-ENTMCNC: 34.7 G/DL (ref 30–36.5)
MCV RBC AUTO: 102.5 FL (ref 80–99)
MONOCYTES # BLD: 0.6 K/UL (ref 0–1)
MONOCYTES NFR BLD: 9 % (ref 5–13)
NEUTS SEG # BLD: 5.7 K/UL (ref 1.8–8)
NEUTS SEG NFR BLD: 79 % (ref 32–75)
NRBC # BLD: 0 K/UL (ref 0–0.01)
NRBC BLD-RTO: 0 PER 100 WBC
PLATELET # BLD AUTO: 207 K/UL (ref 150–400)
PMV BLD AUTO: 9.8 FL (ref 8.9–12.9)
POTASSIUM SERPL-SCNC: 3.8 MMOL/L (ref 3.5–5.1)
PROT SERPL-MCNC: 7.4 G/DL (ref 6.4–8.2)
RBC # BLD AUTO: 5.23 M/UL (ref 3.8–5.2)
RBC MORPH BLD: ABNORMAL
SODIUM SERPL-SCNC: 125 MMOL/L (ref 136–145)
WBC # BLD AUTO: 7.2 K/UL (ref 3.6–11)

## 2022-05-27 NOTE — PROGRESS NOTES
I have called and left a message for this patient to return my call to discuss these lab results and recommendations per the provider.

## 2022-05-27 NOTE — PROGRESS NOTES
Call pt, the electrolytes are low  Increase fluids with electrolytes such as Gatorrade and recheck in 2 w  The kidney and liver tests and Glucose are ok  The CBC is showing some dehydration too

## 2022-06-09 ENCOUNTER — OFFICE VISIT (OUTPATIENT)
Dept: FAMILY MEDICINE CLINIC | Age: 67
End: 2022-06-09
Payer: MEDICARE

## 2022-06-09 VITALS
HEIGHT: 62 IN | DIASTOLIC BLOOD PRESSURE: 84 MMHG | RESPIRATION RATE: 14 BRPM | BODY MASS INDEX: 19.51 KG/M2 | HEART RATE: 94 BPM | OXYGEN SATURATION: 93 % | SYSTOLIC BLOOD PRESSURE: 150 MMHG | TEMPERATURE: 97.2 F | WEIGHT: 106 LBS

## 2022-06-09 DIAGNOSIS — K52.9 GASTROENTERITIS: Primary | ICD-10-CM

## 2022-06-09 DIAGNOSIS — E87.1 HYPONATREMIA: ICD-10-CM

## 2022-06-09 DIAGNOSIS — J42 CHRONIC BRONCHITIS, UNSPECIFIED CHRONIC BRONCHITIS TYPE (HCC): ICD-10-CM

## 2022-06-09 PROCEDURE — G8753 SYS BP > OR = 140: HCPCS | Performed by: FAMILY MEDICINE

## 2022-06-09 PROCEDURE — G8536 NO DOC ELDER MAL SCRN: HCPCS | Performed by: FAMILY MEDICINE

## 2022-06-09 PROCEDURE — G8399 PT W/DXA RESULTS DOCUMENT: HCPCS | Performed by: FAMILY MEDICINE

## 2022-06-09 PROCEDURE — G9899 SCRN MAM PERF RSLTS DOC: HCPCS | Performed by: FAMILY MEDICINE

## 2022-06-09 PROCEDURE — G8427 DOCREV CUR MEDS BY ELIG CLIN: HCPCS | Performed by: FAMILY MEDICINE

## 2022-06-09 PROCEDURE — 1101F PT FALLS ASSESS-DOCD LE1/YR: CPT | Performed by: FAMILY MEDICINE

## 2022-06-09 PROCEDURE — 1123F ACP DISCUSS/DSCN MKR DOCD: CPT | Performed by: FAMILY MEDICINE

## 2022-06-09 PROCEDURE — G8420 CALC BMI NORM PARAMETERS: HCPCS | Performed by: FAMILY MEDICINE

## 2022-06-09 PROCEDURE — G9717 DOC PT DX DEP/BP F/U NT REQ: HCPCS | Performed by: FAMILY MEDICINE

## 2022-06-09 PROCEDURE — G8754 DIAS BP LESS 90: HCPCS | Performed by: FAMILY MEDICINE

## 2022-06-09 PROCEDURE — 3017F COLORECTAL CA SCREEN DOC REV: CPT | Performed by: FAMILY MEDICINE

## 2022-06-09 PROCEDURE — 1090F PRES/ABSN URINE INCON ASSESS: CPT | Performed by: FAMILY MEDICINE

## 2022-06-09 PROCEDURE — 99213 OFFICE O/P EST LOW 20 MIN: CPT | Performed by: FAMILY MEDICINE

## 2022-06-09 RX ORDER — ALBUTEROL SULFATE 90 UG/1
AEROSOL, METERED RESPIRATORY (INHALATION)
Qty: 3 EACH | Refills: 1 | Status: SHIPPED | OUTPATIENT
Start: 2022-06-09

## 2022-06-09 NOTE — PROGRESS NOTES
Myriam Draper is a 79 y.o. female who presents to the office today with the following:  Chief Complaint   Patient presents with    Labs     follow up on electrolyte levels       HPI  Here for 2 w F/U  Had Gastroenteritis  Was given Cipro and Zofran  Resolved now    had low Sodium in BW  Still drinking Gatorrade    Also had COPD  And was on Prednisone taper  Still has some congestion from allergies and postnasal drip  Is taking Zyrtec    Current smoker  Has CT scan of chest tomorrow    Stable now  But needs refill of Albuterol inhaler to mail order pharmacy    Review of Systems   Constitutional: Negative for weight loss. HENT: Positive for congestion (with postnasal drip). Respiratory: Positive for cough, shortness of breath and wheezing (little). Negative for sputum production. Cardiovascular: Negative for chest pain. Gastrointestinal: Negative for abdominal pain, diarrhea, nausea and vomiting. Neurological: Negative for dizziness and headaches. See HPI.     Past Medical History:   Diagnosis Date    Arthritis     HANDS    Breast cancer ([facility] [address] 2013    left with lumpectomy    Chronic sinusitis     Compression fracture     T7, mild on CXR    COPD (chronic obstructive pulmonary disease) (HCC)     mild on CXR    Depression     Diverticulosis     ETOH abuse     Fracture 6/6/14    LEFT 4TH TOE    Herpes simplex type 2 infection     Hyperlipidemia     Hypertension     Menopause     Psychiatric disorder     ANXIETY, DEPRESSION    Radiation therapy complication 9480    12 treatments       Past Surgical History:   Procedure Laterality Date    COLONOSCOPY N/A 6/11/2021    COLONOSCOPY performed by Megha Andrew MD at [facility] 1827 HX BREAST BIOPSY Right     2 STBB benign    HX BREAST LUMPECTOMY Left 6/17/14    malignant    HX COLONOSCOPY  2010    polyps, q4y    HX COLONOSCOPY  2015    polyps x4, 3 adenomatous, 1 hyperplastic    HX COLONOSCOPY  2021    HX GYN  1983 (AFTER HAVING LOST A CHILD)    REVERSAL TUBAL LIGATION    HX TUBAL LIGATION  1980       No Known Allergies    Current Outpatient Medications   Medication Sig    albuterol (Ventolin HFA) 90 mcg/actuation inhaler INHALE 2 PUFFS FOUR TIMES DAILY AS NEEDED    traZODone (DESYREL) 50 mg tablet Take 1 Tablet by mouth nightly.  ondansetron (ZOFRAN ODT) 4 mg disintegrating tablet Take 1 Tablet by mouth every eight (8) hours as needed for Nausea or Vomiting.  citalopram (CELEXA) 10 mg tablet Take one every other day    vitamin e 600 unit capsule Take  by mouth daily.  Cholecalciferol, Vitamin D3, (VITAMIN D3) 1,000 unit cap Take 1 Each by mouth daily.  acetaminophen (TYLENOL) 500 mg tablet Take 500 mg by mouth every six (6) hours as needed for Pain. No current facility-administered medications for this visit. Social History     Socioeconomic History    Marital status:      Spouse name: Melody Nava Number of children: 1    Highest education level: Some college, no degree   Tobacco Use    Smoking status: Current Every Day Smoker     Packs/day: 1.00     Years: 45.00     Pack years: 45.00    Smokeless tobacco: Never Used    Tobacco comment: 112/20/18 patient smoking 405 cigarettes daily, she is trying to quit. Vaping Use    Vaping Use: Never used   Substance and Sexual Activity    Alcohol use: No     Comment: Sober X 18 mo's (10/6/16)    Drug use: No     Comment: MARIJUANA IN DISTANT PAST    Sexual activity: Never   Other Topics Concern     Service No    Blood Transfusions No    Caffeine Concern No    Occupational Exposure No    Sleep Concern Yes    Exercise No    Seat Belt Yes    Self-Exams Yes   Social History Narrative    Lives in [place] with her . 2 cats and a dog. Spiritual.  Finds hope in her 6 yo grandson that lives in [place].          Family History   Problem Relation Age of Onset    Heart Disease Father     Cancer Sister         lung    Anesth Problems Neg Hx Physical Exam:  Visit Vitals  BP (!) 150/84   Pulse 94   Temp 97.2 °F (36.2 °C)   Resp 14   Ht 5' 2\" (1.575 m)   Wt 106 lb (48.1 kg)   SpO2 93%   BMI 19.39 kg/m²     Physical Exam  Vitals and nursing note reviewed. Constitutional:       Appearance: She is normal weight. HENT:      Head: Normocephalic and atraumatic. Right Ear: Tympanic membrane, ear canal and external ear normal.      Left Ear: Tympanic membrane, ear canal and external ear normal.   Eyes:      Extraocular Movements: Extraocular movements intact. Conjunctiva/sclera: Conjunctivae normal.   Cardiovascular:      Rate and Rhythm: Normal rate and regular rhythm. Heart sounds: Normal heart sounds. Pulmonary:      Effort: Pulmonary effort is normal.      Breath sounds: Wheezing (few) present. Musculoskeletal:      Right lower leg: No edema. Left lower leg: No edema. Lymphadenopathy:      Cervical: No cervical adenopathy. Skin:     General: Skin is warm and dry. Neurological:      Mental Status: She is alert and oriented to person, place, and time. Psychiatric:         Mood and Affect: Mood normal.         Behavior: Behavior normal.         Assessment/Plan:    ICD-10-CM ICD-9-CM    1. Gastroenteritis  K52.9 558.9 resolved   2. Hyponatremia  L92.5 190.1 METABOLIC PANEL, BASIC   3. Chronic bronchitis, unspecified chronic bronchitis type (HCC)  J42 491.9 albuterol (Ventolin HFA) 90 mcg/actuation inhaler     F/U for CT scan  Follow-up and Dispositions    · Return if symptoms worsen or fail to improve.          Netta Adams MD

## 2022-06-09 NOTE — PROGRESS NOTES
Labs drawn in l arm per dr armenta's orders. Patient tolerated well. 1. \"Have you been to the ER, urgent care clinic since your last visit? Hospitalized since your last visit? \" No    2. \"Have you seen or consulted any other health care providers outside of the [address] [place] [address] since your last visit? \" No     3. For patients aged 39-70: Has the patient had a colonoscopy / FIT/ Cologuard? Yes - no Care Gap present      If the patient is female:    4. For patients aged 41-77: Has the patient had a mammogram within the past 2 years? Yes - no Care Gap present      5. For patients aged 21-65: Has the patient had a pap smear?  Yes - no Care Gap present

## 2022-06-10 DIAGNOSIS — Z72.0 TOBACCO ABUSE: ICD-10-CM

## 2022-06-10 DIAGNOSIS — Z87.891 PERSONAL HISTORY OF NICOTINE DEPENDENCE: ICD-10-CM

## 2022-06-10 LAB
ANION GAP SERPL CALC-SCNC: 5 MMOL/L (ref 5–15)
BUN SERPL-MCNC: 7 MG/DL (ref 6–20)
BUN/CREAT SERPL: 13 (ref 12–20)
CALCIUM SERPL-MCNC: 8.9 MG/DL (ref 8.5–10.1)
CHLORIDE SERPL-SCNC: 96 MMOL/L (ref 97–108)
CO2 SERPL-SCNC: 29 MMOL/L (ref 21–32)
CREAT SERPL-MCNC: 0.56 MG/DL (ref 0.55–1.02)
GLUCOSE SERPL-MCNC: 87 MG/DL (ref 65–100)
POTASSIUM SERPL-SCNC: 4.6 MMOL/L (ref 3.5–5.1)
SODIUM SERPL-SCNC: 130 MMOL/L (ref 136–145)

## 2022-06-10 NOTE — PROGRESS NOTES
I have spoken to the pt  And explained it the nodule is suspicious  And needs further work up  I will refer pt to a Pulmonologist in [place] [id number]

## 2022-06-10 NOTE — PROGRESS NOTES
I have spoken to the pt, the kidney tests are normal  The electrolytes are better, still a touch low

## 2022-07-25 ENCOUNTER — HOSPITAL ENCOUNTER (OUTPATIENT)
Dept: MAMMOGRAPHY | Age: 67
Discharge: HOME | End: 2022-07-25
Attending: FAMILY MEDICINE
Payer: MEDICARE

## 2022-07-25 DIAGNOSIS — Z12.31 VISIT FOR SCREENING MAMMOGRAM: ICD-10-CM

## 2022-07-25 PROCEDURE — 77063 BREAST TOMOSYNTHESIS BI: CPT

## 2022-08-29 ENCOUNTER — TRANSCRIBE ORDER (OUTPATIENT)
Dept: SCHEDULING | Age: 67
End: 2022-08-29

## 2022-08-29 DIAGNOSIS — R91.1 LUNG NODULE SEEN ON IMAGING STUDY: Primary | ICD-10-CM

## 2022-11-01 ENCOUNTER — TELEPHONE (OUTPATIENT)
Dept: FAMILY MEDICINE CLINIC | Age: 67
End: 2022-11-01

## 2022-11-01 NOTE — TELEPHONE ENCOUNTER
Patient is being discharged today from [facility] for acute respiratory failure. She has an appt on 11.3.2022 at 2:40 with Dr. Samra Vegas.  She will need a MARCELO call at [phone] September 30, 2020       Nery Schmitt MD  [address]  [address]  [facility] [address]  Via Mail      Patient: Sujatha Rojas   YOB: 1947   Date of Visit: 9/25/2020       Dear Dr. Schmitt:    Thank you for referring Sujatha Rojas to me for evaluation. Below are my notes for this visit with her.    If you have questions, please do not hesitate to call me. I look forward to following your patient along with you.      Sincerely,        Niecy Martinez MD        CC: No Recipients  Niecy Martinez MD  9/30/2020  8:42 PM  Signed  VIDEO VISIT    Video visit on Epic/Haiku and Zoom  The video visit was not recorded  Date/time of visit:  9/25/2020  11:00 AM  Duration of visit 20 minutes  Patient location:  home  Physician location:  office  The patient verbally consented      History of Present Illness  72 year old female followed for polymyalgia rheumatica and giant cell arteritis on low dose prednisone 5 mg daily.  Her main problem now was lumbar pain and left lower extremity pain due to lumbar radiculopathy.  Recent MRI scan of the lumbar sine revealed a small left disc herniation at L4-L5 and left neural foramen stenosis at L5-S1. She has been using a lumbar brace for the last 9 years which helps with her back pain.   She also c/o a right 4th trigger finger.      She had no shoulder or hip girdle pain or stiffness. There were no symptoms of giant cell arteritis including headaches, jaw claudication, scalp tenderness, fevers, night sweats, or visual loss, There was no recurrence of right maxillary sinusitis.  She was admitted to [facility] in Jan. 2018 because of a rectal bleed with a drop in her hemoglobin to 9 g. She had colonoscopy and she was found to have a benign polyp and a mucosal ulcer in the colon.  She started taking omega fish oil capsules and her joint pains have resolved. She previously c/o cervical and lumbar radicular symptoms. She had a cervical  epidural steroid injection in interventional radiology in Dec. 2016 with resolution of her cervical symptoms. She also had 2 lumbar epidural steroid injections in July 2017 the the [facility] with resolution of her lumbar symptoms at that time. She had MRI scans of the cervical and lumbar spine regions that revealed degenerative disc disease. She also had an EMG/nerve conduction study suggestive of cervical and lumbar radiculopathy There was presently no cervicalgia or lower back pain. She has been followed for long-standing giant cell arteritis originally diagnosed in 2001 at [facility]. She was stable on low dose prednisone 5 mg daily. On May 25, 2016 while seeing her nephrologist in his office she noted the onset of severe right-sided headache along with numbness involving the right side of her face and right arm and a droop in the right side of her face. She may have also had pain in the right side of her jaw and tenderness to touch on the right side of her scalp. She also had night sweats and possibly fever but this was not documented. She also c/o visual disturbance revealing intermittent \"black spots\" in her visual fields. She went to the ER at [facility] where she was admitted for 2 days. She was seen by neurology and she had an MRI scan of the brain which revealed no evidence of acute stroke. Labs revealed normal ESR and CRP. She was treated with IV meds in hospital but it is not known if she received IV steroids and how much. She was then discharged home on prednisone 5 mg daily. ESR and CRP in our lab were also normal. I referred her to Dr. Billy Diaz in opthalmology who found no abnormalities of the optic nerve. She was sent for Doppler ultrasound of the temporal arteries on May 31, 2016 which was normal with no evidence of vasculitis. She did have evidence of right-sided sphenoidal sinusitis and she was treated with azithromycin with complete resolution of her  facial pain. It was felt that her facial pain had been due to sinus infection and not vasculitis. In the past, she had been on long-term azathioprine and low dose prednisone for 7 years. She was advised to discontinue azathioprine on Oct. 24, 2013 because she appeared to have been in remission for several years. She was maintained on low dose prednisone 5 mg daily. She had posterior cervicalgia and pain in the right suprascapular region due to cervical degenerative disc disease. She had cervical epidural steroid injections Nov. 28, 2014 and again Dec. 8, 2014 at [facility] by Dr. Haroon Pierre with resolution of her cervicalgia. She c/o chronic lumbar pain radiating to both legs. She had a lumbar epidural steroid injection 5-6 years ago at [facility]. She had elevated liver enzymes and CT scan and ultrasound were suggestive of hepatic steatosis. She had originally presented to [facility] in 2001 with polymyalgia rheumatica treated with low-dose prednisone for several years. She then developed right temporal parietal headache and right-sided mandibular pain in 2004. She was felt to have giant cell arteritis and she was started on high-dose prednisone 60 mg per day. There was no visual los at that time. Temporal artery biopsy was not performed at that time according to the patient because of a long delay in the time it would have taken to obtain the biopsy. She was treated with methotrexate for two years which was discontinued due to elevated liver enzymes. She was then started on immunosuppressive therapy with azathioprine as a steroid sparing agent in 2006 originally at 100 mg b.i.d. which was then tapered to to 50 mg qd along with low-dose prednisone 5 mg per day. According to the patient she has had 4 strokes since 2002 the most recent of which was a left-sided stroke in 2010 with residual weakness and numbness in her left arm and left leg. She was on low-dose ASA 81 mg per day. She had  osteopenia diagnosed on DEXA scan in the past treated with Fosamax for more than 5 years but she was no longer taking Fosamax. She had sleep apnea now on CPAP with improvement.      Review of Systems  Const: no fever, not feeling tired, no anorexia  Eyes: no red eyes, no dry eyes, no loss of vision  ENT: no dry mouth and no oral ulcers.   CV: no chest pain and no lower extremity edema.   Resp: no cough and no shortness of breath.   GI: no abdominal pain, no acid reflux, no nausea, no vomiting  : no dysuria.   Musc: back pain, but no joint pain, no joint stiffness, no joint swelling  Neuro: no paresthesia, no headache and no numbness.   Skin: no rash.   Ten or more systems reviewed and negative except as noted above or in the HPI.      Allergies  Atrovent HFA AERS  Aztreonam SOLR  Biaxin TABS  Cipro TABS  Levaquin TABS  NovoLOG FlexPen SOLN  Penicillins  Tradjenta TABS       Current Outpatient Medications   Medication Sig Dispense Refill   • allopurinol (ZYLOPRIM) 300 MG tablet TAKE 0.5 TABLETS (150 MG TOTAL) BY MOUTH 1 (ONE) TIME EACH DAY     • Aspirin Buf,CaCarb-MgCarb-MgO, 81 MG Tab Take 1 tablet by mouth.     • dilTIAZem (CARDIZEM) 120 MG tablet TAKE 1 TABLET  (120 MG TOTAL) BY MOUTH TWICE A DAY     • doxycycline hyclate (VIBRAMYCIN) 100 MG capsule TAKE ONE CAPSULE BY MOUTH TWICE DAILY TOME 1 CAPSULA POR LA BOCA DOS VECES AL VIKY     • insulin aspart (NovoLOG MIX) 70-30 (100 UNIT/ML) injectable suspension Inject 25 Units into the skin.     • TRUEplus Lancets 33G Misc USE AS DIRECTED 3-4 TIMES DAILYSEGUN DIRIGIDO 3-4 TIMES DAILY 400 each 1   • True Metrix Blood Glucose Test test strip TEST BLOOD SUGAR 3 TIMES DAILY AS DIRECTED. 300 each 1   • HumaLOG Mix 75/25 KwikPen (75-25) 100 UNIT/ML pen-injector INJECT 77 UNITS IN THE MORNING AND 44 UNITS AT DINNER 39 mL 1   • Alcohol Swabs (Alcohol Pads) 70 % Pads USE BEFORE AND AFTER CHECKING BLOOD SUGAR 4 TIMES DAILY 600 each 1   • traMADol (ULTRAM) 50 MG tablet Take 1  tablet by mouth every 6 hours as needed for Pain. 120 tablet 0   • azelastine (ASTELIN) 0.1 % nasal spray 1 spray 2 times daily. Use in each nostril as directed     • EASY COMFORT PEN NEEDLES 31G X 8 MM Misc AS DIRECTED 4 TIMES A DAYCOMO DIRIGIDO 4 VECES AL VIKY 400 each 3   • levothyroxine (SYNTHROID, LEVOTHROID) 75 MCG tablet TAKE 1 TABLET BY MOUTH ONCE DAILY TOME TOMASA TABLETA POR LA BOCA DIARAMENTE  1   • insulin lispro (HUMALOG) 100 UNIT/ML injectable solution      • mirabegron ER (MYRBETRIQ) 25 MG 24 hour tablet Take 25 mg by mouth daily.     • Icosapent Ethyl (VASCEPA) 1 g Cap Take 1 g by mouth. Take 2 tabs twice a day     • predniSONE (DELTASONE) 5 MG tablet Take 1 tablet by mouth daily. 90 tablet 3   • Pitavastatin Calcium 2 MG Tab  30 tablet    • acetaminophen (TYLENOL) 325 MG tablet Take 650 mg by mouth.     • albuterol (PROAIR HFA) 108 (90 Base) MCG/ACT inhaler Inhale 2 puffs into the lungs.     • aspirin (ECOTRIN) 81 MG EC tablet Take 81 mg by mouth.     • cloNIDine (CATAPRES) 0.1 MG tablet Take 0.1 mg by mouth.     • dilTIAZem (CARDIZEM CD) 120 MG 24 hr capsule Take 120 mg by mouth.     • diphenhydrAMINE (BENADRYL) 50 MG capsule Take 50 mg by mouth.     • fluticasone (FLONASE) 50 MCG/ACT nasal spray Spray 2 sprays in each nostril.     • montelukast (SINGULAIR) 10 MG tablet Take 10 mg by mouth.     • Multiple Vitamins-Minerals (MULTIVITAL) tablet Take by mouth daily.     • Omega-3 Fatty Acids (FISH OIL) 645 MG Cap      • pantoprazole (PROTONIX) 40 MG tablet Take 40 mg by mouth.     • sulfamethoxazole-trimethoprim (BACTRIM SS) 400-80 MG per tablet Take 1 tablet by mouth.     • ketoconazole (NIZORAL) 2 % cream APPLY SPARINGLY TO AFFECTED AREA(S) TWICE DAILY     • cyclobenzaprine (FLEXERIL) 5 MG tablet TAKE 1 TABLET BY MOUTH THREE TIMES A DAY AS NEEDED PRN     • nitrofurantoin, macrocrystal-monohydrate, (MACROBID) 100 MG capsule Take 100 mg by mouth 2 times daily.     • fenofibrate (TRICOR) 145 MG tablet       • naproxen (ANAPROX) 550 MG tablet Take 550 mg by mouth 2 times daily (with meals).     • gabapentin (NEURONTIN) 100 MG capsule Take 100 mg by mouth 3 times daily.       No current facility-administered medications for this visit.        LAB RESULTS    Component      Latest Ref Rng & Units 8/7/2020   WBC      4.2 - 11.0 K/mcL 12.7 (H)   RBC      4.00 - 5.20 mil/mcL 4.89   HGB      12.0 - 15.5 g/dL 13.6   HCT      36.0 - 46.5 % 43.3   MCV      78.0 - 100.0 fl 88.5   MCH      26.0 - 34.0 pg 27.8   MCHC      32.0 - 36.5 g/dL 31.4 (L)   RDW-CV      11.0 - 15.0 % 14.6   PLT      140 - 450 K/mcL 214   NRBC      0 /100 WBC 0   DIFFERENTIAL TYPE       AUTOMATED DIFFERENTIAL   Neutrophil      % 82   LYMPH      % 8   MONO      % 9   EOSIN      % 0   BASO      % 1   Percent Immature Granuloctyes      % 0   Absolute Neutrophil      1.8 - 7.7 K/mcL 10.4 (H)   Absolute Lymph      1.0 - 4.0 K/mcL 1.0   Absolute Mono      0.3 - 0.9 K/mcL 1.1 (H)   Absolute Eos      0.1 - 0.5 K/mcL 0.1   Absolute Baso      0.0 - 0.3 K/mcL 0.1   Absolute Immature Granulocytes      0 - 0.2 K/mcl 0.1   Fasting Status      hrs 5   Sodium      135 - 145 mmol/L 136   Potassium      3.4 - 5.1 mmol/L 4.4   Chloride      98 - 107 mmol/L 101   CO2      21 - 32 mmol/L 26   ANION GAP      10 - 20 mmol/L 13   Glucose      65 - 99 mg/dL 157 (H)   BUN      6 - 20 mg/dL 21 (H)   Creatinine      0.51 - 0.95 mg/dL 0.76   GFR Estimate,        >90   GFR Estimate, Non African American       78   BUN/CREATININE RATIO      7 - 25 28 (H)   CALCIUM      8.4 - 10.2 mg/dL 10.1   TOTAL BILIRUBIN      0.2 - 1.0 mg/dL 0.5   AST/SGOT      <38 Units/L 129 (H)   ALT/SGPT      <64 Units/L 201 (H)   ALK PHOSPHATASE      45 - 117 Units/L 103   TOTAL PROTEIN      6.4 - 8.2 g/dL 7.6   Albumin      3.6 - 5.1 g/dL 4.1   GLOBULIN      2.0 - 4.0 g/dL 3.5   A/G Ratio, Serum      1.0 - 2.4 1.2   ESR      0 - 20 mm/hr 34 (H)   C-REACTIVE PROTEIN      <1.0 mg/dL 0.9        IMAGING    EXAM: MRI LUMBAR SPINE WO CONTRAST  CLINICAL INDICATION: Left lumbar radiculopathy, low back pain  COMPARISON:  06/17/2014  TECHNIQUE: Sagittal, coronal and axial sequences were performed through the lumbar spine.  There     FINDINGS: There is straightening of the lumbar lordosis as seen previously.  There is no spondylolysis or spondylolisthesis.  Vertebral body heights are normal.  Vertebral marrow signal is normal.       There is minimal bulging of the L1-L2 intervertebral disc without disc herniation.  There is no central spinal canal or foraminal stenosis.     The L2-L3 level is normal.     At the L3-L4 level, there is leftward bulging the intervertebral disc.  There is no disc herniation.  There is no central spinal stenosis.  There is mild narrowing of the inferior aspect of the left neural foramen without compression of the exiting left L3 nerve root.       At the L4-L5 level, there is bulging of the intervertebral disc with small left subarticular central disc protrusion.  This potentially could result in a left L5 radiculopathy.  There is bilateral facet arthropathy and thickening of ligamenta flava resulting in mild bilateral lateral compression of the thecal sac.  These findings are all new compared to prior exam.  There is mild bilateral foraminal stenosis related to the facet arthropathy and bulging disc.       At the L5-S1 level, there is degenerative disc and facet disease.  There is no disc herniation. There is no central spinal stenosis.  There is mild to moderate narrowing of the left neural foramen due to the degenerative disc and facet disease.  There may be mild deformity of the exiting left L5 nerve root.  There is no right foraminal stenosis.       The distal spinal cord is normal in appearance.  There is no mass within the lumbar spinal canal.      IMPRESSION:  There has been progression of degenerative disc and facet disease compared to 06/17/2014.  There is a small left  subarticular disc protrusion at the L4-L5 level and moderate left foraminal stenosis at the L5-S1 level.  Either one of these findings could result in a left L5 radiculopathy.     Electronically Signed by: JESSIKA OSMAN M.D.   Signed on: 9/4/2020 2:22 PM            Accession #[id number]  EXAM: XR DEXA SCAN AXIAL W VERTEBRAL FRAC ASSESS  CLINICAL INDICATION : Osteoporosis.  Scan Unit: Zencoder (Discovery unit)  COMPARISON: 09/19/2017  Sites Analyzed: Lumbar spine (L-1 to L-4 ) & Left hip     FINDINGS:        LUMBAR SPINE:  T-SCORE: -2.4.  No change.        LEFT HIP:            Total analyzed area: T-SCORE:    -0.9.        Femoral Neck:  T-SCORE:   -2.1.  No change.     IMPRESSION:  Osteopenia.  No change.  10 year fracture risk of major osteoporotic fracture 11%.     10 year fracture risk of hip fracture 2.7%.     Follow up exam in 2-3 year(s) is suggested.     Transcribed By: ZULEYMA11/13/19 12:21 pm     Dictated By:  WENDY, PRADIP LATIF MD  Electronically Reviewed and Approved By:  WENDY, PRADIP LATIF MD  11/13/19 12:21 pm      ASSESSMENT  Giant cell arteritis arteritis (M31.6)  Polymyalgia rheumatica (M35.3)  Lumbar radiculopathy (M54.16)  Lumbar spinal stenosis (M48.061)  Cervicalgia (M54.2)  Degenerative disc disease, cervical (M50.30)  Degenerative disc disease, lumbar (M51.36)  Diabetes mellitus (E11.9)  Hypothyroidism (E03.9)  Long-term current use of steroids (Z79.52)  Sleep apnea (G47.30)  Elevated liver enzymes (R74.8)      PLAN  She has an appointment for a lumbar epidural steroid injection in neuro-radiology at [facility] on Sept. 29, 2020.   She is using her lumbar brace for lumbar pain..       She was noted to have elevated liver enzymes which were felt to be due to pitavastatin which was recently started.  She was advised to discontinue the pitavastatin and follow-up with her PCP but she is still on the pitavastatin.  She will see her PCP Dr. Schmitt this afternoon to discuss this problem.     She was advised  to continue prednisone 5 mg daily for PMR. She has not been able to taper her prednisone dose any further without recurrence of her PMR symptoms. There is no indication to restart her on azathioprine. She was advised to continue ASA 81 mg daily which lowers the risk of recurrence of temporal arteritis.     Her DEXA scan revealed osteopenia. She was treated with Fosamax in the past. This will not be restarted as she had a lower GI bleed due to colonic mucosal ulcerations. She has dairy products in her diet. She was advised to take a calcium supplement 600 mg daily and vitamin D3 2000 IU daily.      She will continue omega fish oil supplements.    She will come to my office for a local steroid injection to the right 4th trigger figer.               CC: Nery Schmitt MD

## 2022-11-02 NOTE — TELEPHONE ENCOUNTER
Care Transitions Initial Follow Up Call    Outreach made within 2 business days of discharge: Yes    Patient: Estephania Gusman Patient : 1955   MRN: [id number]  Reason for Admission: Acute Resp Failure  Discharge Date: 21       Spoke with: No Answer, I have left a message for a call back.     Discharge department/facility: [facility]

## 2022-11-03 ENCOUNTER — OFFICE VISIT (OUTPATIENT)
Dept: FAMILY MEDICINE CLINIC | Age: 67
End: 2022-11-03
Payer: MEDICARE

## 2022-11-03 VITALS
DIASTOLIC BLOOD PRESSURE: 70 MMHG | TEMPERATURE: 97.9 F | HEIGHT: 62 IN | BODY MASS INDEX: 19.21 KG/M2 | HEART RATE: 99 BPM | SYSTOLIC BLOOD PRESSURE: 96 MMHG | OXYGEN SATURATION: 93 % | WEIGHT: 104.4 LBS | RESPIRATION RATE: 16 BRPM

## 2022-11-03 DIAGNOSIS — Z87.09 HISTORY OF RESPIRATORY FAILURE: ICD-10-CM

## 2022-11-03 DIAGNOSIS — J42 CHRONIC BRONCHITIS, UNSPECIFIED CHRONIC BRONCHITIS TYPE (HCC): ICD-10-CM

## 2022-11-03 DIAGNOSIS — I10 HYPERTENSION, UNSPECIFIED TYPE: ICD-10-CM

## 2022-11-03 DIAGNOSIS — Z09 HOSPITAL DISCHARGE FOLLOW-UP: Primary | ICD-10-CM

## 2022-11-03 PROCEDURE — 99496 TRANSJ CARE MGMT HIGH F2F 7D: CPT | Performed by: FAMILY MEDICINE

## 2022-11-03 RX ORDER — AMLODIPINE BESYLATE 10 MG/1
5 TABLET ORAL DAILY
Qty: 15 TABLET | Refills: 0
Start: 2022-11-03

## 2022-11-03 RX ORDER — BENZONATATE 100 MG/1
CAPSULE ORAL
COMMUNITY
Start: 2022-11-01

## 2022-11-03 RX ORDER — TIOTROPIUM BROMIDE 18 UG/1
CAPSULE ORAL; RESPIRATORY (INHALATION)
COMMUNITY
Start: 2022-11-01

## 2022-11-03 RX ORDER — FLUTICASONE PROPIONATE AND SALMETEROL 250; 50 UG/1; UG/1
POWDER RESPIRATORY (INHALATION)
COMMUNITY
Start: 2022-11-01

## 2022-11-03 RX ORDER — ISOSORBIDE MONONITRATE 30 MG/1
TABLET, EXTENDED RELEASE ORAL
COMMUNITY
Start: 2022-11-01

## 2022-11-03 RX ORDER — PREDNISONE 10 MG/1
TABLET ORAL
COMMUNITY
Start: 2022-11-01

## 2022-11-03 RX ORDER — CODEINE PHOSPHATE AND GUAIFENESIN 10; 100 MG/5ML; MG/5ML
SOLUTION ORAL
COMMUNITY
Start: 2022-11-01 | End: 2022-11-03

## 2022-11-03 RX ORDER — AMLODIPINE BESYLATE 10 MG/1
TABLET ORAL
COMMUNITY
Start: 2022-11-01 | End: 2022-11-03

## 2022-11-03 NOTE — PROGRESS NOTES
George Rust is a 79 y.o. female who presents to the office today with the following:  Chief Complaint   Patient presents with    Transitions Of Care     [facility] for Acute Resp Failure       HPI  Pt was admitted to [facility] on 10/27/22   For Acute respiratory failure  Possibility of aspiration pneumonia since  The week before did aspirate a pill with water  Discharged 11/1/22, 2 d ago    Was on Bipap for a while  On Abx   Did not get Rx to take home but still  On steroid taper now    Was on Insulin since steroid brought Glucose up    Was started on Amlodipine  And also ISMN    Now BP low  At home 106/74  Today 76/54    Brought home machine and reading very close    Troponin were up  Had Echo with nl EF    Has had CTA showing pulmonary nodule in right UL  May needs Pet /CT  Pt has apt with Pulm 11/14     Pt stopped smoking now    Review of Systems   Constitutional:  Negative for fever. Respiratory:  Positive for cough, sputum production (at times, clear mucus) and shortness of breath (only with activities). Negative for wheezing. Cardiovascular:  Negative for chest pain and leg swelling. Gastrointestinal:  Negative for abdominal pain. Genitourinary: Negative. Neurological:  Negative for dizziness and headaches. See HPI.     Past Medical History:   Diagnosis Date    Arthritis     HANDS    Breast cancer ([address] 2013    left with lumpectomy    Chronic sinusitis     Compression fracture     T7, mild on CXR    COPD (chronic obstructive pulmonary disease) ([address]     mild on CXR    Depression     Diverticulosis     ETOH abuse     Fracture 6/6/14    LEFT 4TH TOE    Herpes simplex type 2 infection     Hyperlipidemia     Hypertension     Menopause     Psychiatric disorder     ANXIETY, DEPRESSION    Radiation therapy complication 4042    12 treatments       Past Surgical History:   Procedure Laterality Date    COLONOSCOPY N/A 6/11/2021    COLONOSCOPY performed by Mindy Ramos MD at [facility] Right     2 STBB benign    HX BREAST LUMPECTOMY Left 6/17/14    malignant    HX COLONOSCOPY  2010    polyps, q4y    HX COLONOSCOPY  2015    polyps x4, 3 adenomatous, 1 hyperplastic    HX COLONOSCOPY  2021    HX GYN  1983 (AFTER HAVING LOST A CHILD)    REVERSAL TUBAL LIGATION    HX TUBAL LIGATION  1980       No Known Allergies    Current Outpatient Medications   Medication Sig    Wixela Inhub 250-50 mcg/dose diskus inhaler     isosorbide mononitrate ER (IMDUR) 30 mg tablet     predniSONE (DELTASONE) 10 mg tablet     Spiriva with HandiHaler 18 mcg inhalation capsule     amLODIPine (NORVASC) 10 mg tablet Take 0.5 Tablets by mouth daily. albuterol (Ventolin HFA) 90 mcg/actuation inhaler INHALE 2 PUFFS FOUR TIMES DAILY AS NEEDED    traZODone (DESYREL) 50 mg tablet Take 1 Tablet by mouth nightly. citalopram (CELEXA) 10 mg tablet Take one every other day    acetaminophen (TYLENOL) 500 mg tablet Take 500 mg by mouth every six (6) hours as needed for Pain. benzonatate (TESSALON) 100 mg capsule  (Patient not taking: Reported on 11/3/2022)     No current facility-administered medications for this visit. Social History     Socioeconomic History    Marital status:      Spouse name: Solo Arguelles    Number of children: 1    Highest education level: Some college, no degree   Tobacco Use    Smoking status: Former     Packs/day: 1.00     Years: 45.00     Pack years: 45.00     Types: Cigarettes    Smokeless tobacco: Never    Tobacco comments:     112/20/18 patient smoking 405 cigarettes daily, she is trying to quit.    Vaping Use    Vaping Use: Never used   Substance and Sexual Activity    Alcohol use: No     Comment: Sober X 18 mo's (10/6/16)    Drug use: No     Comment: MARIJUANA IN DISTANT PAST    Sexual activity: Never   Other Topics Concern     Service No    Blood Transfusions No    Caffeine Concern No    Occupational Exposure No    Sleep Concern Yes    Exercise No    Seat Belt Yes    Self-Exams Yes Social History Narrative    Lives in [place] with her . 2 cats and a dog. Spiritual.  Finds hope in her 4 yo grandson that lives in [place]. Family History   Problem Relation Age of Onset    Heart Disease Father     Cancer Sister         lung    Anesth Problems Neg Hx          Physical Exam:  Visit Vitals  BP 96/70 (BP 1 Location: Left upper arm, BP Patient Position: Sitting, BP Cuff Size: Adult) Comment: patient's cuff   Pulse 99   Temp 97.9 °F (36.6 °C) (Temporal)   Resp 16   Ht 5' 2\" (1.575 m)   Wt 104 lb 6.4 oz (47.4 kg)   SpO2 93%   BMI 19.10 kg/m²     Physical Exam  Vitals and nursing note reviewed. Constitutional:       Appearance: She is normal weight. HENT:      Head: Normocephalic and atraumatic. Right Ear: Tympanic membrane, ear canal and external ear normal.      Left Ear: Tympanic membrane, ear canal and external ear normal.      Nose: Congestion present. Mouth/Throat:      Mouth: Mucous membranes are moist.      Pharynx: Oropharynx is clear. Eyes:      Extraocular Movements: Extraocular movements intact. Conjunctiva/sclera: Conjunctivae normal.   Cardiovascular:      Rate and Rhythm: Normal rate and regular rhythm. Heart sounds: Normal heart sounds. Pulmonary:      Effort: Pulmonary effort is normal.      Breath sounds: Normal breath sounds. Musculoskeletal:      Right lower leg: No edema. Left lower leg: No edema. Lymphadenopathy:      Cervical: No cervical adenopathy. Skin:     General: Skin is warm and dry. Neurological:      General: No focal deficit present. Mental Status: She is alert and oriented to person, place, and time. Psychiatric:         Behavior: Behavior normal.       Assessment/Plan:    ICD-10-CM ICD-9-CM    1. Hospital discharge follow-up  Z09 V67.59       2. History of respiratory failure  Z87.09 V12.69 Pt much improved, finish steroid taper      3.  Hypertension, unspecified type  I10 401.9 amLODIPine (NORVASC) 10 mg tablet cut back to 5 mg since BP low now  Cont the ISMN  F/U in 2-3 w for recheck      4.  Chronic bronchitis, unspecified chronic bronchitis type (HCC)  J42 491.9 Cont inhalers  Cont to stop smoking          F/U with Pulmonologis  May need Pet/CT d/t lung nodule    Monitor BP and recheck in 2-3 w    Yoni Drake MD

## 2022-11-03 NOTE — PROGRESS NOTES
1. \"Have you been to the ER, urgent care clinic since your last visit? Hospitalized since your last visit? \" Yes Where: [facility]    2. \"Have you seen or consulted any other health care providers outside of the [address] [place] [address] since your last visit? \" No     3. For patients aged 39-70: Has the patient had a colonoscopy / FIT/ Cologuard? Yes - no Care Gap present      If the patient is female:    4. For patients aged 41-77: Has the patient had a mammogram within the past 2 years? Yes - no Care Gap present      5. For patients aged 21-65: Has the patient had a pap smear?  Yes - no Care Gap present

## 2022-11-17 ENCOUNTER — TRANSCRIBE ORDER (OUTPATIENT)
Dept: SCHEDULING | Age: 67
End: 2022-11-17

## 2022-11-17 DIAGNOSIS — R91.1 LUNG NODULE SEEN ON IMAGING STUDY: Primary | ICD-10-CM

## 2022-12-19 DIAGNOSIS — G47.00 INSOMNIA, UNSPECIFIED TYPE: ICD-10-CM

## 2022-12-19 RX ORDER — TRAZODONE HYDROCHLORIDE 50 MG/1
50 TABLET ORAL
Qty: 90 TABLET | Refills: 0 | Status: SHIPPED | OUTPATIENT
Start: 2022-12-19

## 2022-12-19 NOTE — TELEPHONE ENCOUNTER
Requested Prescriptions     Pending Prescriptions Disp Refills    traZODone (DESYREL) 50 mg tablet 90 Tablet 1     Sig: Take 1 Tablet by mouth nightly.

## 2022-12-21 ENCOUNTER — TELEPHONE (OUTPATIENT)
Dept: FAMILY MEDICINE CLINIC | Age: 67
End: 2022-12-21

## 2022-12-21 DIAGNOSIS — A31.0 MYCOBACTERIUM AVIUM INFECTION (HCC): ICD-10-CM

## 2022-12-21 DIAGNOSIS — Z22.39 MYCOBACTERIUM AVIUM COMPLEX COLONIZATION: Primary | ICD-10-CM

## 2022-12-21 NOTE — TELEPHONE ENCOUNTER
I have taken a call from Dr Queenie Mercado at [facility]. He calls to report a positive sputum culture on this patient. 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 He reports that the patient will need a referral to Infectious Disease as the treatment for this takes about 6-12 months.

## 2023-01-31 DIAGNOSIS — J42 CHRONIC BRONCHITIS, UNSPECIFIED CHRONIC BRONCHITIS TYPE (HCC): ICD-10-CM

## 2023-01-31 RX ORDER — ALBUTEROL SULFATE 90 UG/1
AEROSOL, METERED RESPIRATORY (INHALATION)
Qty: 3 EACH | Refills: 1 | Status: SHIPPED | OUTPATIENT
Start: 2023-01-31

## 2023-01-31 NOTE — TELEPHONE ENCOUNTER
Pt needs her ventolin inhaler sent to her mail order pharmacy    Requested Prescriptions     Pending Prescriptions Disp Refills    albuterol (Ventolin HFA) 90 mcg/actuation inhaler 3 Each 1     Sig: INHALE 2 PUFFS FOUR TIMES DAILY AS NEEDED

## 2023-03-22 DIAGNOSIS — G47.00 INSOMNIA, UNSPECIFIED TYPE: ICD-10-CM

## 2023-03-23 RX ORDER — TRAZODONE HYDROCHLORIDE 50 MG/1
TABLET ORAL
Qty: 90 TABLET | Refills: 0 | Status: SHIPPED | OUTPATIENT
Start: 2023-03-23

## 2023-04-21 DIAGNOSIS — R91.1 LUNG NODULE SEEN ON IMAGING STUDY: Primary | ICD-10-CM

## 2023-04-22 DIAGNOSIS — R91.1 LUNG NODULE SEEN ON IMAGING STUDY: Primary | ICD-10-CM

## 2023-05-18 RX ORDER — CITALOPRAM 10 MG/1
TABLET ORAL
COMMUNITY
Start: 2021-05-24 | End: 2023-06-15 | Stop reason: SDUPTHER

## 2023-05-18 RX ORDER — BENZONATATE 100 MG/1
CAPSULE ORAL
COMMUNITY
Start: 2022-11-01

## 2023-05-18 RX ORDER — ACETAMINOPHEN 500 MG
500 TABLET ORAL EVERY 6 HOURS PRN
COMMUNITY

## 2023-05-18 RX ORDER — ISOSORBIDE MONONITRATE 30 MG/1
TABLET, EXTENDED RELEASE ORAL
COMMUNITY
Start: 2022-11-01

## 2023-05-18 RX ORDER — FLUTICASONE PROPIONATE AND SALMETEROL 250; 50 UG/1; UG/1
POWDER RESPIRATORY (INHALATION)
COMMUNITY
Start: 2022-11-01

## 2023-05-18 RX ORDER — ALBUTEROL SULFATE 90 UG/1
AEROSOL, METERED RESPIRATORY (INHALATION)
COMMUNITY
Start: 2023-01-31 | End: 2023-06-15 | Stop reason: SDUPTHER

## 2023-05-18 RX ORDER — AMLODIPINE BESYLATE 10 MG/1
5 TABLET ORAL DAILY
COMMUNITY
Start: 2022-11-03

## 2023-05-18 RX ORDER — PREDNISONE 10 MG/1
TABLET ORAL
COMMUNITY
Start: 2022-11-01

## 2023-05-18 RX ORDER — TRAZODONE HYDROCHLORIDE 50 MG/1
1 TABLET ORAL NIGHTLY
COMMUNITY
Start: 2023-03-23

## 2023-05-18 RX ORDER — TIOTROPIUM BROMIDE 18 UG/1
CAPSULE ORAL; RESPIRATORY (INHALATION)
COMMUNITY
Start: 2022-11-01

## 2023-06-16 ENCOUNTER — TELEPHONE (OUTPATIENT)
Dept: FAMILY MEDICINE CLINIC | Age: 68
End: 2023-06-16

## 2023-06-17 RX ORDER — ALBUTEROL SULFATE 90 UG/1
AEROSOL, METERED RESPIRATORY (INHALATION)
Qty: 54 G | Refills: 0 | OUTPATIENT
Start: 2023-06-17

## 2023-06-19 NOTE — TELEPHONE ENCOUNTER
I have called and spoke to this patient. She tells me that she has gotten things straight with the pharmacy. They have filled Ventolin because that is what her insurance will cover. They are filling it for her now. No further action required.

## 2023-06-19 NOTE — TELEPHONE ENCOUNTER
"APPT INFO    Date /Time: 11/1/17 8:30AM    Reason for Appt: Diarrhea. No outside records.   Ref Provider/Clinic: Dr Dos Santos    Patient Contact (Y/N) & Call Details: No, pt was referred.    Action: None      RECORDS CLINIC NAME  (\"None\" if no records ) RECEIVED RECS & IMG? Y/N   (may include other helpful notes)   Internal Clinics:  Clinics Leyla Dos Santos MD  Recs & referrals in Epic   Transferred recs scanned in [facility]   [facility] 2017     Colonoscopy 10/5/17     [facility]  Recs are in [facility]    External Clinics: None         " Patient states she was told by someone in our office that Dr. Kevin Kimbrough had already taken care of her ventolin Rx. However, she received a message form her pharmacy that it was on hold, and it was for albuterol. Patient only has 3 day left. Please advise.  [phone]

## 2023-07-12 RX ORDER — ALBUTEROL SULFATE 90 UG/1
AEROSOL, METERED RESPIRATORY (INHALATION)
Qty: 18 G | Refills: 0 | Status: SHIPPED | OUTPATIENT
Start: 2023-07-12 | End: 2023-09-11 | Stop reason: SDUPTHER

## 2023-07-12 NOTE — TELEPHONE ENCOUNTER
Requested Prescriptions     Pending Prescriptions Disp Refills    albuterol sulfate HFA (VENTOLIN HFA) 108 (90 Base) MCG/ACT inhaler [Pharmacy Med Name: VENTOLIN  (90 Base) MCG/ACT Aerosol Solution] 18 g 0     Sig: INHALE 2 PUFFS FOUR TIMES DAILY AS NEEDED     LOV 11/3/22  Last labs 6/9/22  Last refill 6/15/23 #18g with 0 RF

## 2023-09-06 ENCOUNTER — TRANSCRIBE ORDERS (OUTPATIENT)
Facility: HOSPITAL | Age: 68
End: 2023-09-06

## 2023-09-06 DIAGNOSIS — Z12.31 SCREENING MAMMOGRAM FOR BREAST CANCER: Primary | ICD-10-CM

## 2023-09-11 ENCOUNTER — OFFICE VISIT (OUTPATIENT)
Dept: FAMILY MEDICINE CLINIC | Age: 68
End: 2023-09-11
Payer: MEDICARE

## 2023-09-11 VITALS
HEART RATE: 101 BPM | TEMPERATURE: 97.8 F | RESPIRATION RATE: 12 BRPM | BODY MASS INDEX: 20.8 KG/M2 | WEIGHT: 113 LBS | SYSTOLIC BLOOD PRESSURE: 164 MMHG | OXYGEN SATURATION: 90 % | DIASTOLIC BLOOD PRESSURE: 91 MMHG | HEIGHT: 62 IN

## 2023-09-11 DIAGNOSIS — F41.9 ANXIETY AND DEPRESSION: ICD-10-CM

## 2023-09-11 DIAGNOSIS — R60.9 EDEMA, UNSPECIFIED TYPE: Primary | ICD-10-CM

## 2023-09-11 DIAGNOSIS — Z00.00 MEDICARE ANNUAL WELLNESS VISIT, SUBSEQUENT: ICD-10-CM

## 2023-09-11 DIAGNOSIS — Z11.59 NEED FOR HEPATITIS C SCREENING TEST: ICD-10-CM

## 2023-09-11 DIAGNOSIS — Z87.891 PERSONAL HISTORY OF TOBACCO USE: ICD-10-CM

## 2023-09-11 DIAGNOSIS — E78.5 HYPERLIPIDEMIA, UNSPECIFIED HYPERLIPIDEMIA TYPE: ICD-10-CM

## 2023-09-11 DIAGNOSIS — F51.01 PRIMARY INSOMNIA: ICD-10-CM

## 2023-09-11 DIAGNOSIS — Z23 IMMUNIZATION DUE: ICD-10-CM

## 2023-09-11 DIAGNOSIS — J18.9 PNEUMONIA OF BOTH LUNGS DUE TO INFECTIOUS ORGANISM, UNSPECIFIED PART OF LUNG: ICD-10-CM

## 2023-09-11 DIAGNOSIS — J44.9 CHRONIC OBSTRUCTIVE PULMONARY DISEASE, UNSPECIFIED COPD TYPE (HCC): ICD-10-CM

## 2023-09-11 DIAGNOSIS — F32.A ANXIETY AND DEPRESSION: ICD-10-CM

## 2023-09-11 PROCEDURE — G0439 PPPS, SUBSEQ VISIT: HCPCS | Performed by: FAMILY MEDICINE

## 2023-09-11 PROCEDURE — 3077F SYST BP >= 140 MM HG: CPT | Performed by: FAMILY MEDICINE

## 2023-09-11 PROCEDURE — 99214 OFFICE O/P EST MOD 30 MIN: CPT | Performed by: FAMILY MEDICINE

## 2023-09-11 PROCEDURE — 3017F COLORECTAL CA SCREEN DOC REV: CPT | Performed by: FAMILY MEDICINE

## 2023-09-11 PROCEDURE — 4004F PT TOBACCO SCREEN RCVD TLK: CPT | Performed by: FAMILY MEDICINE

## 2023-09-11 PROCEDURE — G0296 VISIT TO DETERM LDCT ELIG: HCPCS | Performed by: FAMILY MEDICINE

## 2023-09-11 PROCEDURE — G8420 CALC BMI NORM PARAMETERS: HCPCS | Performed by: FAMILY MEDICINE

## 2023-09-11 PROCEDURE — 3023F SPIROM DOC REV: CPT | Performed by: FAMILY MEDICINE

## 2023-09-11 PROCEDURE — 1090F PRES/ABSN URINE INCON ASSESS: CPT | Performed by: FAMILY MEDICINE

## 2023-09-11 PROCEDURE — G8428 CUR MEDS NOT DOCUMENT: HCPCS | Performed by: FAMILY MEDICINE

## 2023-09-11 PROCEDURE — 90677 PCV20 VACCINE IM: CPT | Performed by: FAMILY MEDICINE

## 2023-09-11 PROCEDURE — 3080F DIAST BP >= 90 MM HG: CPT | Performed by: FAMILY MEDICINE

## 2023-09-11 PROCEDURE — G0009 ADMIN PNEUMOCOCCAL VACCINE: HCPCS | Performed by: FAMILY MEDICINE

## 2023-09-11 PROCEDURE — G8399 PT W/DXA RESULTS DOCUMENT: HCPCS | Performed by: FAMILY MEDICINE

## 2023-09-11 PROCEDURE — 1123F ACP DISCUSS/DSCN MKR DOCD: CPT | Performed by: FAMILY MEDICINE

## 2023-09-11 RX ORDER — FUROSEMIDE 40 MG/1
TABLET ORAL
Qty: 30 TABLET | Refills: 0 | Status: SHIPPED | OUTPATIENT
Start: 2023-09-11

## 2023-09-11 RX ORDER — AZITHROMYCIN 250 MG/1
250 TABLET, FILM COATED ORAL SEE ADMIN INSTRUCTIONS
Qty: 6 TABLET | Refills: 0 | Status: SHIPPED | OUTPATIENT
Start: 2023-09-11 | End: 2023-09-11

## 2023-09-11 RX ORDER — AZITHROMYCIN 250 MG/1
250 TABLET, FILM COATED ORAL SEE ADMIN INSTRUCTIONS
Qty: 6 TABLET | Refills: 0 | Status: SHIPPED | OUTPATIENT
Start: 2023-09-11 | End: 2023-09-16

## 2023-09-11 RX ORDER — CITALOPRAM HYDROBROMIDE 10 MG/1
TABLET ORAL
Qty: 90 TABLET | Refills: 1 | Status: SHIPPED | OUTPATIENT
Start: 2023-09-11

## 2023-09-11 RX ORDER — CEFTRIAXONE 1 G/1
1000 INJECTION, POWDER, FOR SOLUTION INTRAMUSCULAR; INTRAVENOUS ONCE
Status: SHIPPED | OUTPATIENT
Start: 2023-09-11

## 2023-09-11 RX ORDER — TRAZODONE HYDROCHLORIDE 50 MG/1
50 TABLET ORAL NIGHTLY
Qty: 90 TABLET | Refills: 1 | Status: SHIPPED | OUTPATIENT
Start: 2023-09-11

## 2023-09-11 RX ORDER — FLUTICASONE PROPIONATE 220 UG/1
1 AEROSOL, METERED RESPIRATORY (INHALATION) 2 TIMES DAILY
Qty: 3 EACH | Refills: 0 | Status: SHIPPED | OUTPATIENT
Start: 2023-09-11 | End: 2023-09-16 | Stop reason: SDUPTHER

## 2023-09-11 RX ORDER — ALBUTEROL SULFATE 90 UG/1
AEROSOL, METERED RESPIRATORY (INHALATION)
Qty: 54 G | Refills: 0 | Status: SHIPPED | OUTPATIENT
Start: 2023-09-11

## 2023-09-11 ASSESSMENT — PATIENT HEALTH QUESTIONNAIRE - PHQ9
4. FEELING TIRED OR HAVING LITTLE ENERGY: 1
SUM OF ALL RESPONSES TO PHQ QUESTIONS 1-9: 9
SUM OF ALL RESPONSES TO PHQ9 QUESTIONS 1 & 2: 2
SUM OF ALL RESPONSES TO PHQ QUESTIONS 1-9: 9
1. LITTLE INTEREST OR PLEASURE IN DOING THINGS: 1
3. TROUBLE FALLING OR STAYING ASLEEP: 1
9. THOUGHTS THAT YOU WOULD BE BETTER OFF DEAD, OR OF HURTING YOURSELF: 1
SUM OF ALL RESPONSES TO PHQ QUESTIONS 1-9: 9
6. FEELING BAD ABOUT YOURSELF - OR THAT YOU ARE A FAILURE OR HAVE LET YOURSELF OR YOUR FAMILY DOWN: 1
5. POOR APPETITE OR OVEREATING: 1
8. MOVING OR SPEAKING SO SLOWLY THAT OTHER PEOPLE COULD HAVE NOTICED. OR THE OPPOSITE, BEING SO FIGETY OR RESTLESS THAT YOU HAVE BEEN MOVING AROUND A LOT MORE THAN USUAL: 1
7. TROUBLE CONCENTRATING ON THINGS, SUCH AS READING THE NEWSPAPER OR WATCHING TELEVISION: 1
SUM OF ALL RESPONSES TO PHQ QUESTIONS 1-9: 8
2. FEELING DOWN, DEPRESSED OR HOPELESS: 1

## 2023-09-11 ASSESSMENT — ENCOUNTER SYMPTOMS
ABDOMINAL PAIN: 0
GASTROINTESTINAL NEGATIVE: 1
SHORTNESS OF BREATH: 1
WHEEZING: 1
COUGH: 1

## 2023-09-11 ASSESSMENT — LIFESTYLE VARIABLES
HOW OFTEN DO YOU HAVE A DRINK CONTAINING ALCOHOL: 4 OR MORE TIMES A WEEK
HOW MANY STANDARD DRINKS CONTAINING ALCOHOL DO YOU HAVE ON A TYPICAL DAY: 1 OR 2

## 2023-09-11 NOTE — PROGRESS NOTES
Successful venipuncture in patient's Right forearm X 1 sticks. The patient tolerated this procedure w/o c/o pain or discomfort.

## 2023-09-11 NOTE — PROGRESS NOTES
Robin Galvez is a 76 y.o. female who presents to the office today with the following:  Chief Complaint   Patient presents with    Medicare AWV         Edema     feet       Edema  Associated symptoms include coughing, fatigue and headaches (occ). Pertinent negatives include no abdominal pain, chest pain or fever. HM reviewed    Diagnosed with MAC  Not seeing Pulm or infectious ds specialist since Jan  Not on meds  Stopped Wixela and Spiriva d/t cost  And COPD  Still smoking    Depression and Anxiety  On Celexa and helping  And taking Trazodone occ  Needs refills    HTN  /80's    Hyperlipidemia  Fasting today    Not on Imdur anymore  Was taken off per pt    Edema   Last 4 d  2nd time in last mo    Cough a mo or more      Lost mother 2/23      Review of Systems   Constitutional:  Positive for fatigue. Negative for fever and unexpected weight change. Respiratory:  Positive for cough, shortness of breath (at times) and wheezing. Cardiovascular:  Positive for leg swelling. Negative for chest pain and palpitations. Gastrointestinal: Negative. Negative for abdominal pain. Genitourinary:  Positive for dysuria (sometimes) and urgency. Neurological:  Positive for headaches (occ). Negative for dizziness. Psychiatric/Behavioral:  Positive for dysphoric mood (some). Nervous/anxious: some. See HPI.     Past Medical History:   Diagnosis Date    Arthritis     HANDS    Breast cancer ([address]) 2013    left with lumpectomy    Chronic sinusitis     Compression fracture     T7, mild on CXR    COPD (chronic obstructive pulmonary disease) ([address])     mild on CXR    Depression     Diverticulosis     ETOH abuse     Fracture 6/6/14    LEFT 4TH TOE    Herpes simplex type 2 infection     Hyperlipidemia     Hypertension     Menopause     Mycobacterium avium complex ([address])     Psychiatric disorder     ANXIETY, DEPRESSION    Radiation therapy complication 8353    12 treatments       Past Surgical History:

## 2023-09-12 LAB
ALBUMIN SERPL-MCNC: 3.2 G/DL (ref 3.5–5)
ALBUMIN/GLOB SERPL: 0.9 (ref 1.1–2.2)
ALP SERPL-CCNC: 104 U/L (ref 45–117)
ALT SERPL-CCNC: 15 U/L (ref 12–78)
ANION GAP SERPL CALC-SCNC: 3 MMOL/L (ref 5–15)
AST SERPL-CCNC: 14 U/L (ref 15–37)
BASOPHILS # BLD: 0.1 K/UL (ref 0–0.1)
BASOPHILS NFR BLD: 1 % (ref 0–1)
BILIRUB SERPL-MCNC: 0.7 MG/DL (ref 0.2–1)
BUN SERPL-MCNC: 8 MG/DL (ref 6–20)
BUN/CREAT SERPL: 15 (ref 12–20)
CALCIUM SERPL-MCNC: 8.9 MG/DL (ref 8.5–10.1)
CHLORIDE SERPL-SCNC: 96 MMOL/L (ref 97–108)
CHOLEST SERPL-MCNC: 192 MG/DL
CO2 SERPL-SCNC: 37 MMOL/L (ref 21–32)
CREAT SERPL-MCNC: 0.53 MG/DL (ref 0.55–1.02)
DIFFERENTIAL METHOD BLD: ABNORMAL
EOSINOPHIL # BLD: 0 K/UL (ref 0–0.4)
EOSINOPHIL NFR BLD: 0 % (ref 0–7)
ERYTHROCYTE [DISTWIDTH] IN BLOOD BY AUTOMATED COUNT: 13.2 % (ref 11.5–14.5)
GLOBULIN SER CALC-MCNC: 3.4 G/DL (ref 2–4)
GLUCOSE SERPL-MCNC: 102 MG/DL (ref 65–100)
HCT VFR BLD AUTO: 55.7 % (ref 35–47)
HCV AB SER IA-ACNC: 0.14 INDEX
HCV AB SERPL QL IA: NONREACTIVE
HDLC SERPL-MCNC: 81 MG/DL
HDLC SERPL: 2.4 (ref 0–5)
HGB BLD-MCNC: 17.4 G/DL (ref 11.5–16)
IMM GRANULOCYTES # BLD AUTO: 0 K/UL (ref 0–0.04)
IMM GRANULOCYTES NFR BLD AUTO: 0 % (ref 0–0.5)
LDLC SERPL CALC-MCNC: 93.6 MG/DL (ref 0–100)
LYMPHOCYTES # BLD: 0.7 K/UL (ref 0.8–3.5)
LYMPHOCYTES NFR BLD: 13 % (ref 12–49)
MCH RBC QN AUTO: 36 PG (ref 26–34)
MCHC RBC AUTO-ENTMCNC: 31.2 G/DL (ref 30–36.5)
MCV RBC AUTO: 115.3 FL (ref 80–99)
MONOCYTES # BLD: 0.6 K/UL (ref 0–1)
MONOCYTES NFR BLD: 11 % (ref 5–13)
NEUTS SEG # BLD: 3.8 K/UL (ref 1.8–8)
NEUTS SEG NFR BLD: 75 % (ref 32–75)
NRBC # BLD: 0 K/UL (ref 0–0.01)
NRBC BLD-RTO: 0 PER 100 WBC
PLATELET # BLD AUTO: 199 K/UL (ref 150–400)
PLATELET COMMENT: ABNORMAL
PMV BLD AUTO: 10 FL (ref 8.9–12.9)
POTASSIUM SERPL-SCNC: 4 MMOL/L (ref 3.5–5.1)
PROT SERPL-MCNC: 6.6 G/DL (ref 6.4–8.2)
RBC # BLD AUTO: 4.83 M/UL (ref 3.8–5.2)
RBC MORPH BLD: ABNORMAL
RBC MORPH BLD: ABNORMAL
SODIUM SERPL-SCNC: 136 MMOL/L (ref 136–145)
TRIGL SERPL-MCNC: 87 MG/DL
VLDLC SERPL CALC-MCNC: 17.4 MG/DL
WBC # BLD AUTO: 5.2 K/UL (ref 3.6–11)

## 2023-09-15 ENCOUNTER — TELEPHONE (OUTPATIENT)
Dept: FAMILY MEDICINE CLINIC | Age: 68
End: 2023-09-15

## 2023-09-15 NOTE — TELEPHONE ENCOUNTER
[employer] faxes prior auth form for fluticasone prop hfa 220 mcg. Form stats that flovent brand name is the preferred medication and does not require prior auth.  Fax new rx to [phone] or call [phone]

## 2023-09-16 DIAGNOSIS — J44.9 CHRONIC OBSTRUCTIVE PULMONARY DISEASE, UNSPECIFIED COPD TYPE (HCC): Primary | ICD-10-CM

## 2023-09-16 RX ORDER — FLUTICASONE PROPIONATE 220 UG/1
1 AEROSOL, METERED RESPIRATORY (INHALATION) 2 TIMES DAILY
Qty: 3 EACH | Refills: 0 | Status: SHIPPED | OUTPATIENT
Start: 2023-09-16

## 2023-09-21 ENCOUNTER — HOSPITAL ENCOUNTER (OUTPATIENT)
Facility: HOSPITAL | Age: 68
Discharge: HOME | End: 2023-09-21
Attending: FAMILY MEDICINE
Payer: MEDICARE

## 2023-09-21 ENCOUNTER — HOSPITAL ENCOUNTER (OUTPATIENT)
Facility: HOSPITAL | Age: 68
End: 2023-09-21
Attending: FAMILY MEDICINE
Payer: MEDICARE

## 2023-09-21 DIAGNOSIS — J18.9 PNEUMONIA OF BOTH LUNGS DUE TO INFECTIOUS ORGANISM, UNSPECIFIED PART OF LUNG: ICD-10-CM

## 2023-09-21 DIAGNOSIS — Z12.31 SCREENING MAMMOGRAM FOR BREAST CANCER: ICD-10-CM

## 2023-09-21 PROCEDURE — 71046 X-RAY EXAM CHEST 2 VIEWS: CPT

## 2023-09-21 PROCEDURE — 77063 BREAST TOMOSYNTHESIS BI: CPT

## 2023-10-09 ENCOUNTER — TELEPHONE (OUTPATIENT)
Dept: FAMILY MEDICINE CLINIC | Age: 68
End: 2023-10-09

## 2023-10-09 NOTE — TELEPHONE ENCOUNTER
25-Aug-2019 08:15 Pt had inpt stay at [facility] for COPD exacerbation.      Best number for call back is [phone] 25-Aug-2019 08:24

## 2023-10-30 ENCOUNTER — TELEPHONE (OUTPATIENT)
Dept: FAMILY MEDICINE CLINIC | Age: 68
End: 2023-10-30

## 2023-10-30 NOTE — TELEPHONE ENCOUNTER
Patient would like a call back regarding a refill for Trulicy (she said she got this prescribed when she was in rehab. I do not see it on her list).   She would like a nurse to call her back to discuss at [phone]

## 2023-10-31 ENCOUNTER — TELEMEDICINE (OUTPATIENT)
Dept: FAMILY MEDICINE CLINIC | Age: 68
End: 2023-10-31
Payer: MEDICARE

## 2023-10-31 DIAGNOSIS — J44.9 CHRONIC OBSTRUCTIVE PULMONARY DISEASE, UNSPECIFIED COPD TYPE (HCC): ICD-10-CM

## 2023-10-31 DIAGNOSIS — Z09 HOSPITAL DISCHARGE FOLLOW-UP: Primary | ICD-10-CM

## 2023-10-31 PROCEDURE — G2025 DIS SITE TELE SVCS RHC/FQHC: HCPCS | Performed by: FAMILY MEDICINE

## 2023-10-31 RX ORDER — LANOLIN ALCOHOL/MO/W.PET/CERES
CREAM (GRAM) TOPICAL NIGHTLY PRN
COMMUNITY

## 2023-10-31 RX ORDER — FLUTICASONE FUROATE, UMECLIDINIUM BROMIDE AND VILANTEROL TRIFENATATE 100; 62.5; 25 UG/1; UG/1; UG/1
1 POWDER RESPIRATORY (INHALATION) DAILY
Qty: 1 EACH | Refills: 11 | COMMUNITY
Start: 2023-10-09 | End: 2023-10-31 | Stop reason: SDUPTHER

## 2023-10-31 RX ORDER — OXYCODONE HYDROCHLORIDE AND ACETAMINOPHEN 5; 325 MG/1; MG/1
TABLET ORAL
COMMUNITY
Start: 2023-10-27

## 2023-10-31 RX ORDER — PSEUDOEPHEDRINE HCL 30 MG
100 TABLET ORAL 2 TIMES DAILY
COMMUNITY
Start: 2023-10-27 | End: 2023-10-31 | Stop reason: CLARIF

## 2023-10-31 RX ORDER — NICOTINE 21 MG/24HR
1 PATCH, TRANSDERMAL 24 HOURS TRANSDERMAL EVERY 24 HOURS
Qty: 30 PATCH | Refills: 11 | COMMUNITY
Start: 2023-10-12 | End: 2024-10-11

## 2023-10-31 RX ORDER — FLUTICASONE FUROATE, UMECLIDINIUM BROMIDE AND VILANTEROL TRIFENATATE 100; 62.5; 25 UG/1; UG/1; UG/1
1 POWDER RESPIRATORY (INHALATION) DAILY
Qty: 1 EACH | Refills: 0 | Status: SHIPPED | OUTPATIENT
Start: 2023-10-31 | End: 2023-11-02 | Stop reason: SDUPTHER

## 2023-10-31 ASSESSMENT — ENCOUNTER SYMPTOMS
SHORTNESS OF BREATH: 0
COUGH: 0
WHEEZING: 0

## 2023-10-31 NOTE — TELEPHONE ENCOUNTER
Patient returns my call. I explain that we are unable to write a prescription for Truliciy without seeing her first as we have not given her this in the past.  I have transferred the call to the PSRs to set her up an apt.

## 2023-11-01 ENCOUNTER — TELEPHONE (OUTPATIENT)
Dept: FAMILY MEDICINE CLINIC | Age: 68
End: 2023-11-01

## 2023-11-02 DIAGNOSIS — J44.9 CHRONIC OBSTRUCTIVE PULMONARY DISEASE, UNSPECIFIED COPD TYPE (HCC): ICD-10-CM

## 2023-11-02 RX ORDER — FLUTICASONE FUROATE, UMECLIDINIUM BROMIDE AND VILANTEROL TRIFENATATE 100; 62.5; 25 UG/1; UG/1; UG/1
1 POWDER RESPIRATORY (INHALATION) DAILY
Qty: 3 EACH | Refills: 3 | Status: SHIPPED | OUTPATIENT
Start: 2023-11-02 | End: 2023-11-03

## 2023-11-02 NOTE — TELEPHONE ENCOUNTER
Pt returns call. She says [facility]  says there was a problem with the way the trelegy was ordered.

## 2023-11-03 DIAGNOSIS — J44.9 CHRONIC OBSTRUCTIVE PULMONARY DISEASE, UNSPECIFIED COPD TYPE (HCC): ICD-10-CM

## 2023-11-03 RX ORDER — FLUTICASONE FUROATE, UMECLIDINIUM BROMIDE AND VILANTEROL TRIFENATATE 100; 62.5; 25 UG/1; UG/1; UG/1
1 POWDER RESPIRATORY (INHALATION) DAILY
Qty: 30 EACH | Refills: 0 | Status: SHIPPED | OUTPATIENT
Start: 2023-11-03

## 2023-11-03 NOTE — TELEPHONE ENCOUNTER
I have called [facility] and spoke to Will.  The RX for Trelegy was sent to dispense \"1 each\"  this needs to be changed to say dispense \"60 Blisters\".  They can not take a verbal over the phone.  Please adjust the RX and re-send to [facility].  Thank you.

## (undated) DEVICE — SOL IRR STRL H2O 1000ML BTL --

## (undated) DEVICE — ENDO CARRY-ON PROCEDURE KIT INCLUDES ENZYMATIC SPONGE, GAUZE, BIOHAZARD LABEL, TRAY, LUBRICANT, DIRTY SCOPE LABEL, WATER LABEL, TRAY, DRAWSTRING PAD, AND DEFENDO 4-PIECE KIT.: Brand: ENDO CARRY-ON PROCEDURE KIT